# Patient Record
Sex: MALE | Race: WHITE | NOT HISPANIC OR LATINO | Employment: FULL TIME
[De-identification: names, ages, dates, MRNs, and addresses within clinical notes are randomized per-mention and may not be internally consistent; named-entity substitution may affect disease eponyms.]

---

## 2021-03-30 ENCOUNTER — TRANSCRIBE ORDERS (OUTPATIENT)
Dept: OTHER | Age: 56
End: 2021-03-30

## 2021-03-30 DIAGNOSIS — M54.50 LOW BACK PAIN WITHOUT SCIATICA, UNSPECIFIED BACK PAIN LATERALITY, UNSPECIFIED CHRONICITY: Primary | ICD-10-CM

## 2021-03-30 DIAGNOSIS — M54.2 NECK PAIN: ICD-10-CM

## 2021-04-20 ENCOUNTER — HOSPITAL ENCOUNTER (OUTPATIENT)
Dept: PHYSICAL THERAPY | Facility: CLINIC | Age: 56
Setting detail: THERAPIES SERIES
End: 2021-04-20
Attending: FAMILY MEDICINE
Payer: COMMERCIAL

## 2021-04-20 DIAGNOSIS — M54.50 LOW BACK PAIN WITHOUT SCIATICA, UNSPECIFIED BACK PAIN LATERALITY, UNSPECIFIED CHRONICITY: ICD-10-CM

## 2021-04-20 DIAGNOSIS — M54.2 NECK PAIN: ICD-10-CM

## 2021-04-20 PROCEDURE — 97140 MANUAL THERAPY 1/> REGIONS: CPT | Mod: GP | Performed by: PHYSICAL THERAPIST

## 2021-04-20 PROCEDURE — 97112 NEUROMUSCULAR REEDUCATION: CPT | Mod: GP | Performed by: PHYSICAL THERAPIST

## 2021-04-20 PROCEDURE — 97162 PT EVAL MOD COMPLEX 30 MIN: CPT | Mod: GP | Performed by: PHYSICAL THERAPIST
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
## 2021-04-27 ENCOUNTER — HOSPITAL ENCOUNTER (OUTPATIENT)
Dept: PHYSICAL THERAPY | Facility: CLINIC | Age: 56
Setting detail: THERAPIES SERIES
End: 2021-04-27
Attending: FAMILY MEDICINE
Payer: COMMERCIAL

## 2021-04-27 PROCEDURE — 97012 MECHANICAL TRACTION THERAPY: CPT | Mod: GP | Performed by: PHYSICAL THERAPIST

## 2021-04-27 PROCEDURE — 97140 MANUAL THERAPY 1/> REGIONS: CPT | Mod: GP | Performed by: PHYSICAL THERAPIST

## 2021-04-27 PROCEDURE — 97110 THERAPEUTIC EXERCISES: CPT | Mod: GP | Performed by: PHYSICAL THERAPIST

## 2021-05-17 ENCOUNTER — HOSPITAL ENCOUNTER (OUTPATIENT)
Dept: PHYSICAL THERAPY | Facility: CLINIC | Age: 56
Setting detail: THERAPIES SERIES
End: 2021-05-17
Attending: FAMILY MEDICINE
Payer: COMMERCIAL

## 2021-05-17 PROCEDURE — 97112 NEUROMUSCULAR REEDUCATION: CPT | Mod: GP | Performed by: PHYSICAL THERAPIST

## 2021-05-24 ENCOUNTER — HOSPITAL ENCOUNTER (OUTPATIENT)
Dept: PHYSICAL THERAPY | Facility: CLINIC | Age: 56
Setting detail: THERAPIES SERIES
End: 2021-05-24
Attending: FAMILY MEDICINE
Payer: COMMERCIAL

## 2021-05-24 PROCEDURE — 97140 MANUAL THERAPY 1/> REGIONS: CPT | Mod: GP | Performed by: PHYSICAL THERAPIST

## 2021-05-24 PROCEDURE — 97112 NEUROMUSCULAR REEDUCATION: CPT | Mod: GP | Performed by: PHYSICAL THERAPIST

## 2021-05-28 ENCOUNTER — TELEPHONE (OUTPATIENT)
Dept: PHYSICAL THERAPY | Facility: CLINIC | Age: 56
End: 2021-05-28

## 2021-07-13 ENCOUNTER — TRANSFERRED RECORDS (OUTPATIENT)
Dept: HEALTH INFORMATION MANAGEMENT | Facility: CLINIC | Age: 56
End: 2021-07-13

## 2021-07-19 ENCOUNTER — MEDICAL CORRESPONDENCE (OUTPATIENT)
Dept: HEALTH INFORMATION MANAGEMENT | Facility: CLINIC | Age: 56
End: 2021-07-19

## 2021-07-19 ENCOUNTER — TRANSFERRED RECORDS (OUTPATIENT)
Dept: HEALTH INFORMATION MANAGEMENT | Facility: CLINIC | Age: 56
End: 2021-07-19

## 2021-08-03 NOTE — DISCHARGE SUMMARY
Outpatient Physical Therapy Discharge Note     Patient: Thad Guerra  : 1965    Beginning/End Dates of Reporting Period:  4 sessions between  and 2021    Referring Provider: Last Hill MD    Therapy Diagnosis: L low back L3-4-5 facet tightness with sciatic nerve irritation     Client Self Report: At the time of his last session: Patient upset as he is in pain. He feels there may be a disc and he thinks an MRI is needed    Objective Measurements:   Patient in severe pain and requested leg pull trial again as this was helpful. He had some relief with the distraction.          Goals:  Goal Identifier Sleep   Goal Description Jeovany will be able to sleep through the night for healing using support for legs and spine   Target Date 21   Date Met      Progress (detail required for progress note): not met at time of last session     Goal Identifier Home program   Goal Description Jeovany will be compliant with home program and be able to apply concepts of pain so he can return to his previous level of function.    Target Date 06/15/21   Date Met      Progress (detail required for progress note): acute symptoms not met     Plan:  Discharge from therapy.    Discharge:    Reason for Discharge: Patient has failed to schedule further appointments.    Equipment Issued: none    Discharge Plan: did not complete PT and he wanted to consider MRI which is reasonable due to acute symptoms. Unable to contact patient by phone prior to discharge.

## 2021-08-16 ENCOUNTER — OFFICE VISIT (OUTPATIENT)
Dept: NEUROSURGERY | Facility: CLINIC | Age: 56
End: 2021-08-16
Payer: COMMERCIAL

## 2021-08-16 VITALS
DIASTOLIC BLOOD PRESSURE: 68 MMHG | BODY MASS INDEX: 34.51 KG/M2 | SYSTOLIC BLOOD PRESSURE: 118 MMHG | TEMPERATURE: 97.6 F | HEIGHT: 71 IN | WEIGHT: 246.5 LBS

## 2021-08-16 DIAGNOSIS — M47.816 LUMBAR FACET ARTHROPATHY: Primary | ICD-10-CM

## 2021-08-16 PROCEDURE — 99203 OFFICE O/P NEW LOW 30 MIN: CPT | Performed by: PHYSICIAN ASSISTANT

## 2021-08-16 ASSESSMENT — MIFFLIN-ST. JEOR: SCORE: 1980.25
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
## 2021-08-16 NOTE — PROGRESS NOTES
"Thad Guerra is a 55 year old male who presents for:  No chief complaint on file.       Initial Vitals:  /68   Temp 97.6  F (36.4  C) (Temporal)   Ht 5' 11\" (1.803 m)   Wt 246 lb 8 oz (111.8 kg)   BMI 34.38 kg/m   Estimated body mass index is 34.38 kg/m  as calculated from the following:    Height as of this encounter: 5' 11\" (1.803 m).    Weight as of this encounter: 246 lb 8 oz (111.8 kg).. Body surface area is 2.37 meters squared. BP completed using cuff size: regular  Data Unavailable    Nursing Comments:     Susan Trejo CMA      "
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
## 2021-09-13 ENCOUNTER — TELEPHONE (OUTPATIENT)
Dept: NEUROSURGERY | Facility: CLINIC | Age: 56
End: 2021-09-13

## 2021-11-11 ENCOUNTER — OFFICE VISIT (OUTPATIENT)
Dept: NEUROSURGERY | Facility: CLINIC | Age: 56
End: 2021-11-11
Payer: COMMERCIAL

## 2021-11-11 VITALS
DIASTOLIC BLOOD PRESSURE: 72 MMHG | WEIGHT: 257.3 LBS | TEMPERATURE: 97.4 F | BODY MASS INDEX: 36.02 KG/M2 | SYSTOLIC BLOOD PRESSURE: 126 MMHG | HEIGHT: 71 IN

## 2021-11-11 DIAGNOSIS — M54.16 LUMBAR RADICULOPATHY: Primary | ICD-10-CM

## 2021-11-11 PROCEDURE — 99213 OFFICE O/P EST LOW 20 MIN: CPT | Performed by: NEUROLOGICAL SURGERY

## 2021-11-11 ASSESSMENT — PAIN SCALES - GENERAL: PAINLEVEL: SEVERE PAIN (7)

## 2021-11-11 ASSESSMENT — MIFFLIN-ST. JEOR: SCORE: 2029.24

## 2021-11-11 NOTE — PATIENT INSTRUCTIONS
1. Referral to [facility] for management. They will call you to schedule. [phone]    ADAM Rico  --  [facility]  Phone: [phone]  Fax: [phone]
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
## 2021-11-11 NOTE — PROGRESS NOTES
Pain management referral faxed to [facility] at [phone]. Verified via Right[employer]ax.    Trisha Kurtz RN on 11/11/2021 at 3:58 PM

## 2021-11-11 NOTE — PROGRESS NOTES
"Thad Guerra is a 55 year old male who presents for:  Chief Complaint   Patient presents with     Neurologic Problem        Initial Vitals:  /72   Temp 97.4  F (36.3  C) (Temporal)   Ht 5' 11\" (1.803 m)   Wt 257 lb 4.8 oz (116.7 kg)   BMI 35.89 kg/m   Estimated body mass index is 35.89 kg/m  as calculated from the following:    Height as of this encounter: 5' 11\" (1.803 m).    Weight as of this encounter: 257 lb 4.8 oz (116.7 kg).. Body surface area is 2.42 meters squared. BP completed using cuff size: regular  Severe Pain (7)    Nursing Comments:     Susan Trejo  "
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
## 2022-09-21 LAB
CHOLESTEROL (EXTERNAL): 149 MG/DL (ref 100–199)
HDLC SERPL-MCNC: 43 MG/DL
HEP C HIM: NORMAL
LDL CHOLESTEROL CALCULATED (EXTERNAL): 92 MG/DL
NON HDL CHOLESTEROL (EXTERNAL): 106 MG/DL
TRIGLYCERIDES (EXTERNAL): 68 MG/DL

## 2023-09-19 ENCOUNTER — NURSE TRIAGE (OUTPATIENT)
Dept: FAMILY MEDICINE | Facility: OTHER | Age: 58
End: 2023-09-19
Payer: COMMERCIAL

## 2023-09-19 ENCOUNTER — OFFICE VISIT (OUTPATIENT)
Dept: URGENT CARE | Facility: URGENT CARE | Age: 58
End: 2023-09-19
Payer: COMMERCIAL

## 2023-09-19 VITALS
HEART RATE: 94 BPM | OXYGEN SATURATION: 96 % | WEIGHT: 261.8 LBS | DIASTOLIC BLOOD PRESSURE: 75 MMHG | TEMPERATURE: 98.1 F | BODY MASS INDEX: 36.51 KG/M2 | SYSTOLIC BLOOD PRESSURE: 120 MMHG

## 2023-09-19 DIAGNOSIS — R07.89 CHEST TIGHTNESS: Primary | ICD-10-CM

## 2023-09-19 PROCEDURE — 93000 ELECTROCARDIOGRAM COMPLETE: CPT

## 2023-09-19 PROCEDURE — 99204 OFFICE O/P NEW MOD 45 MIN: CPT | Mod: 25

## 2023-09-19 NOTE — TELEPHONE ENCOUNTER
"SITUATION:   Chest pain, left arm pain    BACKGROUND:   The patient describes \" chest tightness\" in his chest. He has left arm pain.   Symptoms have been present for about a year.   Chest Tightness - Denies difficult breathing. He feels tight in the sternum area. The sensation radiates into the left shoulder. The feeling is constant for about a week.   Per patient his wife feels his face is swollen.   The patient feels weak.     HOME TREATMENTS:  Rest    HEALTH HISTORY:  No hx of chest pain or heart attack, dm, hypertension, hyperlipemia.   Father had 2 stents placed/pacemaker at 91 years old.   Smokes mariajuana.      PATIENT REQUEST:       NURSE RECOMMENDATION:   Recommend going into the ER.     PLAN:   Patient will go to the .     Does the patient meet one of the following criteria for ADS visit consideration? No     RECOMMENDED DISPOSITION:  Urgent Care/ ER if symptoms worsen.   Will comply with recommendation: yes   If further questions/concerns or if Sx do not improve, worsen or new Sx develop, call your PCP or [facility] as soon as possible.    NOTES:  Disposition was determined by the first positive assessment question, therefore all previous assessment questions were negative.       TIP MaharajN, RN, PHN  [place] [facility]  September 19, 2023    Reason for Disposition   Pain also in shoulder(s) or arm(s) or jaw    Additional Information   Negative: SEVERE difficulty breathing (e.g., struggling for each breath, speaks in single words)   Negative: Difficult to awaken or acting confused (e.g., disoriented, slurred speech)   Negative: Shock suspected (e.g., cold/pale/clammy skin, too weak to stand, low BP, rapid pulse)   Negative: Passed out (i.e., lost consciousness, collapsed and was not responding)   Negative: Chest pain lasting longer than 5 minutes and over 44 years old   Negative: Chest pain lasting longer than 5 minutes, over 30 years old, and at least one cardiac " risk factor (e.g., diabetes mellitus, high blood pressure, high cholesterol, smoker, or strong family history of heart disease)   Negative: Chest pain lasting longer than 5 minutes and history of heart disease (i.e., angina, heart attack, heart failure, bypass surgery, takes nitroglycerin)   Negative: Chest pain lasting longer than 5 minutes and pain is crushing, pressure-like, or heavy   Negative: Heart beating < 50 beats per minute OR > 140 beats per minute   Negative: Visible sweat on face or sweat dripping down face   Negative: Sounds like a life-threatening emergency to the triager    Protocols used: Chest Pain-A-[place]

## 2023-09-19 NOTE — PATIENT INSTRUCTIONS
Given you current symptoms and the abnormal EKG; it is advised that your proceed to an emergency department for further evaluation and treatment.

## 2023-09-19 NOTE — PROGRESS NOTES
Assessment & Plan   (R07.89) Chest tightness  (primary encounter diagnosis)  Plan: EKG 12-lead complete w/read - Clinics    Informed the patient that given his current and persistent symptoms and the abnormal EKG; it is advised that he proceed to an emergency department for further evaluation and treatment.  No prior EKG to compare the EKG performed in the clinic today.  The patient acknowledged his understanding of the above plan and indicated he would proceed to an emergency department for further evaluation and treatment.    The use of Dragon/Boommy Fashionation services may have been used to construct the content in this note; any grammatical or spelling errors are non-intentional. Please contact the author of this note directly if you are in need of any clarification.      BRENDEN Hernadez [facility] [place] One[place] is a 57 year old male who presents to clinic today for the following health issues:  Chief Complaint   Patient presents with    chest tightness     When sitting he can feel his heart beating, going on for 3 months. Pt is having pain in left arm, sometime he cannot feel fingers, tingling in fingers.     Fatigue     Pt feels weak noticed it for 3 months - about the same     HPI  The patient reports having intermittent chest tightness, left shoulder pain, weakness and feels his heart while he is sitting that has been occurring for the past 3 months.  He has not utilized any treatment for this and indicates he would like to have testing done to make sure it is not his heart.  He states he spoke with his neighbor who told him that given his age and weight he should get his heart checked out.    ROS:  Negative except noted above.    Review of Systems        Objective    /75 (BP Location: Left arm, Patient Position: Sitting, Cuff Size: Adult Large)   Pulse 94   Temp 98.1  F (36.7  C) (Tympanic)   Wt 118.8 kg (261 lb 12.8 oz)   SpO2 96%    BMI 36.51 kg/m    Physical Exam   GENERAL: healthy, alert and no distress  NECK: no adenopathy, no asymmetry, masses, or scars and thyroid normal to palpation  RESP: lungs clear to auscultation - no rales, rhonchi or wheezes  CV: regular rate and rhythm, normal S1 S2, no S3 or S4, no murmur, click or rub, no peripheral edema and peripheral pulses strong  MS: no gross musculoskeletal defects noted, no edema  SKIN: no suspicious lesions or rashes  NEURO: Normal strength and tone, mentation intact and speech normal  PSYCH: mentation appears normal, affect normal/bright

## 2023-09-20 ENCOUNTER — TRANSFERRED RECORDS (OUTPATIENT)
Dept: MULTI SPECIALTY CLINIC | Facility: CLINIC | Age: 58
End: 2023-09-20

## 2023-09-20 LAB — GLUCOSE (EXTERNAL): 154 MG/DL (ref 70–99)

## 2023-12-07 ENCOUNTER — HOSPITAL ENCOUNTER (EMERGENCY)
Facility: CLINIC | Age: 58
Discharge: HOME | End: 2023-12-07
Attending: FAMILY MEDICINE | Admitting: FAMILY MEDICINE
Payer: COMMERCIAL

## 2023-12-07 ENCOUNTER — NURSE TRIAGE (OUTPATIENT)
Dept: NURSING | Facility: CLINIC | Age: 58
End: 2023-12-07
Payer: COMMERCIAL

## 2023-12-07 ENCOUNTER — APPOINTMENT (OUTPATIENT)
Dept: GENERAL RADIOLOGY | Facility: CLINIC | Age: 58
End: 2023-12-07
Attending: FAMILY MEDICINE
Payer: COMMERCIAL

## 2023-12-07 VITALS
HEART RATE: 82 BPM | RESPIRATION RATE: 16 BRPM | OXYGEN SATURATION: 97 % | SYSTOLIC BLOOD PRESSURE: 160 MMHG | DIASTOLIC BLOOD PRESSURE: 90 MMHG | TEMPERATURE: 97.9 F

## 2023-12-07 DIAGNOSIS — J20.9 ACUTE BRONCHITIS, UNSPECIFIED ORGANISM: ICD-10-CM

## 2023-12-07 PROCEDURE — 250N000009 HC RX 250: Performed by: FAMILY MEDICINE

## 2023-12-07 PROCEDURE — 71045 X-RAY EXAM CHEST 1 VIEW: CPT

## 2023-12-07 PROCEDURE — 99283 EMERGENCY DEPT VISIT LOW MDM: CPT | Mod: 25 | Performed by: FAMILY MEDICINE

## 2023-12-07 PROCEDURE — 94640 AIRWAY INHALATION TREATMENT: CPT | Performed by: FAMILY MEDICINE

## 2023-12-07 PROCEDURE — 99284 EMERGENCY DEPT VISIT MOD MDM: CPT | Performed by: FAMILY MEDICINE

## 2023-12-07 RX ORDER — IPRATROPIUM BROMIDE AND ALBUTEROL SULFATE 2.5; .5 MG/3ML; MG/3ML
3 SOLUTION RESPIRATORY (INHALATION) ONCE
Status: COMPLETED | OUTPATIENT
Start: 2023-12-07 | End: 2023-12-07

## 2023-12-07 RX ORDER — ALBUTEROL SULFATE 0.83 MG/ML
2.5 SOLUTION RESPIRATORY (INHALATION) EVERY 6 HOURS PRN
Qty: 75 ML | Refills: 0 | Status: SHIPPED | OUTPATIENT
Start: 2023-12-07 | End: 2024-05-13

## 2023-12-07 RX ADMIN — IPRATROPIUM BROMIDE AND ALBUTEROL SULFATE 3 ML: 2.5; .5 SOLUTION RESPIRATORY (INHALATION) at 06:31

## 2023-12-07 ASSESSMENT — ACTIVITIES OF DAILY LIVING (ADL): ADLS_ACUITY_SCORE: 35

## 2023-12-07 NOTE — ED TRIAGE NOTES
"Cough beginning 11/27. Seen in clinic yesterday and prescribed ABX, prednisone and inhaler. Pt is experiencing coughing fits that \"kill me and take my breath away\".      Triage Assessment (Adult)       Row Name 12/07/23 0616          Triage Assessment    Airway WDL WDL        Respiratory WDL    Respiratory WDL X;cough     Cough Frequency frequent     Cough Type congested        Skin Circulation/Temperature WDL    Skin Circulation/Temperature WDL WDL        Cardiac WDL    Cardiac WDL WDL        Peripheral/Neurovascular WDL    Peripheral Neurovascular WDL WDL        Cognitive/Neuro/Behavioral WDL    Cognitive/Neuro/Behavioral WDL WDL                     "

## 2023-12-07 NOTE — ED PROVIDER NOTES
History     Chief Complaint   Patient presents with    Shortness of Breath     HPI  Thad Guerra is a 57 year old male who presents with concerns of coughing fits and unable to catch his breath.  Patient has had a cough for the past 10 days now, patient went to urgent care yesterday and they started the patient on prednisone, amoxicillin, and inhaler and Robitussin with codeine.  Patient states that he did start the medicines last night.  Patient states he had a lot of congestion in his chest but since starting all this he is having less congestion.  He states that he was trying to sleep and they woke up with a severe coughing spasm and felt like every time he is trying to exhale, he could not stop coughing or catch his breath.  Patient denies any fevers or chills.  Denies any chest pain.    Allergies:  No Known Allergies    Problem List:    There are no problems to display for this patient.       Past Medical History:    Past Medical History:   Diagnosis Date    Bell's palsy        Past Surgical History:    No past surgical history on file.    Family History:    No family history on file.    Social History:  Marital Status:   [2]  Social History     Tobacco Use    Smoking status: Never    Smokeless tobacco: Never   Substance Use Topics    Alcohol use: Yes    Drug use: Yes     Types: Marijuana        Medications:    albuterol (PROVENTIL) (2.5 MG/3ML) 0.083% neb solution  acyclovir (ZOVIRAX) 400 MG tablet  predniSONE (DELTASONE) 20 MG tablet  traMADol (ULTRAM) 50 MG tablet          Review of Systems   All other systems reviewed and are negative.      Physical Exam   BP: (!) 160/90  Pulse: 82  Temp: 97.9  F (36.6  C)  Resp: 16  SpO2: 97 %      Physical Exam  Vitals and nursing note reviewed.   Constitutional:       General: He is not in acute distress.     Appearance: He is well-developed. He is not diaphoretic.   HENT:      Head: Normocephalic and atraumatic.      Nose: Nose normal.      Mouth/Throat:       Pharynx: No oropharyngeal exudate.   Eyes:      General: No scleral icterus.     Conjunctiva/sclera: Conjunctivae normal.      Pupils: Pupils are equal, round, and reactive to light.   Cardiovascular:      Rate and Rhythm: Normal rate and regular rhythm.      Heart sounds: Normal heart sounds. No murmur heard.     No friction rub.   Pulmonary:      Effort: Pulmonary effort is normal. No respiratory distress.      Breath sounds: Wheezing and rhonchi present. No rales.   Abdominal:      General: Bowel sounds are normal. There is no distension.      Palpations: Abdomen is soft. There is no mass.      Tenderness: There is no abdominal tenderness. There is no guarding or rebound.   Musculoskeletal:         General: No tenderness. Normal range of motion.      Cervical back: Normal range of motion.   Skin:     General: Skin is warm.      Findings: No rash.   Neurological:      Mental Status: He is alert and oriented to person, place, and time.   Psychiatric:         Judgment: Judgment normal.         ED Course                 Procedures         Results for orders placed or performed during the hospital encounter of 12/07/23 (from the past 24 hour(s))   XR Chest Port 1 View    Narrative    EXAM: XR CHEST PORT 1 VIEW  LOCATION: [facility]  DATE: 12/7/2023    INDICATION: cough, sob  COMPARISON: None.      Impression    IMPRESSION: Slight atelectasis or scarring at the left base. Lungs otherwise clear. Normal heart size and pulmonary vascularity. No pleural fluid or pneumothorax.       Medications   ipratropium - albuterol 0.5 mg/2.5 mg/3 mL (DUONEB) neb solution 3 mL (3 mLs Nebulization $Given 12/7/23 0631)       X-ray did not show any acute findings, mostly atelectasis.  Patient feels significantly better after the nebulizer treatment so we will send the patient home with a nebulizer and albuterol for this to use instead.  I think as the steroid start to kick in that he just started last  night, he should start to feel better.  Patient will be discharged at this time.    Assessments & Plan (with Medical Decision Making)  Acute bronchitis     I have reviewed the nursing notes.    I have reviewed the findings, diagnosis, plan and need for follow up with the patient.        New Prescriptions    ALBUTEROL (PROVENTIL) (2.5 MG/3ML) 0.083% NEB SOLUTION    Take 1 vial (2.5 mg) by nebulization every 6 hours as needed for shortness of breath, wheezing or cough       Final diagnoses:   Acute bronchitis, unspecified organism       12/7/2023   [facility] EMERGENCY DEPT       Karson Olmos MD  12/07/23 0757

## 2023-12-07 NOTE — TELEPHONE ENCOUNTER
Six days trouble breathing. Had severe cough with lots of mucus in lungs. Yesterday went to [facility], urgent care. Prescribed prednisone 20 mg, two tablets. Given inhaler, amonoclav (amoxicillin), robitussin with codeine. Now mucus is gone out of lungs, two doses of antibiotics, prednisone, cough medication taken. If he inhales, when he exhales, lungs start going weird. Was sleeping and woke starving for oxygen.  He stated he can't sit in the ER for six hours.  He doesn't have a clinic he goes to. Refuses to [facility] to [facility] because he doesn't trust them there.  He said he will drive to the [facility] ER. I called the ER to find out how many people were waiting in the ER and told this to the patient, it's a good time to go there now. He said he will drive there himself.  Dannielle Weinstein RN  [facility]    Reason for Disposition   [1] MODERATE difficulty breathing (e.g., speaks in phrases, SOB even at rest, pulse 100-120) AND [2] NEW-onset or WORSE than normal    Additional Information   Negative: SEVERE difficulty breathing (e.g., struggling for each breath, speaks in single words)   Negative: [1] Breathing stopped AND [2] hasn't returned   Negative: Choking on something   Negative: Bluish (or gray) lips or face now   Negative: Difficult to awaken or acting confused (e.g., disoriented, slurred speech)   Negative: Passed out (i.e., lost consciousness, collapsed and was not responding)   Negative: Wheezing started suddenly after medicine, an allergic food or bee sting   Negative: Stridor (harsh sound while breathing in)   Negative: Slow, shallow and weak breathing   Negative: Sounds like a life-threatening emergency to the triager   Negative: Chest pain   Negative: [1] Wheezing (high pitched whistling sound) AND [2] previous asthma attacks or use of asthma medicines   Negative: [1] Difficulty breathing AND [2] only present when coughing   Negative: [1] Difficulty breathing AND [2] only from stuffy  or runny nose   Negative: [1] Difficulty breathing AND [2] within 14 days of COVID-19 Exposure    Protocols used: Breathing Difficulty-A-AH

## 2023-12-07 NOTE — DISCHARGE INSTRUCTIONS
1.  Use the albuterol nebulizer every 4 hours as needed for coughing fits or bronchospasm.  Use this in place of the inhaler.

## 2023-12-25 ENCOUNTER — HOSPITAL ENCOUNTER (EMERGENCY)
Facility: CLINIC | Age: 58
Discharge: HOME | End: 2023-12-25
Attending: FAMILY MEDICINE | Admitting: FAMILY MEDICINE
Payer: COMMERCIAL

## 2023-12-25 ENCOUNTER — APPOINTMENT (OUTPATIENT)
Dept: GENERAL RADIOLOGY | Facility: CLINIC | Age: 58
End: 2023-12-25
Attending: FAMILY MEDICINE
Payer: COMMERCIAL

## 2023-12-25 VITALS
DIASTOLIC BLOOD PRESSURE: 98 MMHG | WEIGHT: 272.6 LBS | SYSTOLIC BLOOD PRESSURE: 156 MMHG | HEART RATE: 92 BPM | BODY MASS INDEX: 38.02 KG/M2 | RESPIRATION RATE: 18 BRPM | TEMPERATURE: 97.8 F | OXYGEN SATURATION: 98 %

## 2023-12-25 DIAGNOSIS — M75.32 CALCIFIC TENDONITIS OF LEFT SHOULDER: ICD-10-CM

## 2023-12-25 DIAGNOSIS — M25.512 ACUTE PAIN OF LEFT SHOULDER: ICD-10-CM

## 2023-12-25 PROCEDURE — 73030 X-RAY EXAM OF SHOULDER: CPT | Mod: LT

## 2023-12-25 PROCEDURE — 99283 EMERGENCY DEPT VISIT LOW MDM: CPT | Performed by: FAMILY MEDICINE

## 2023-12-25 PROCEDURE — 99284 EMERGENCY DEPT VISIT MOD MDM: CPT | Performed by: FAMILY MEDICINE

## 2023-12-25 ASSESSMENT — ACTIVITIES OF DAILY LIVING (ADL): ADLS_ACUITY_SCORE: 35

## 2023-12-25 NOTE — ED TRIAGE NOTES
L shoulder pain started 4 days ago and it is worsening to the point that he can not raise his arm

## 2023-12-25 NOTE — ED PROVIDER NOTES
ED Provider Note     Thad Guerra  [id number]  December 25, 2023      CC:     Chief Complaint   Patient presents with    Shoulder Pain          History is obtained from patient.    HPI: Thad Guerra is a 58 year old male presenting with 4-day history of left shoulder pain, worse over the last 2 days.  Patient is having difficulty raising his left shoulder due to severe pain.  He denies any recent injury/trauma.  He has never had anything like this before.  He had 3 weeks of the respiratory illness and is still getting over this.  He is right-hand dominant and has been laid off recently so he is not doing anything exertional or out of the ordinary.  He typically does not lay on his left shoulder but finds that it is impossible at nighttime to lay on the shoulder.  He feels most comfortable when he is able to lay on his back and have his arms straight out or if he has a pillow that he can hug with his left arm around the pillow.  He does not have any numbness or tingling into the fingers.  No pain from the elbows down.  He denies any neck pain.  Pain is currently rated 8.5/10 with movement, and 3/10 at rest.        PMH/Problem List:   Past Medical History:   Diagnosis Date    Bell's palsy        PSH: History reviewed. No pertinent surgical history.    MEDS: No current facility-administered medications on file prior to encounter.  acyclovir (ZOVIRAX) 400 MG tablet, Take 1 tablet (400 mg) by mouth 5 times daily (Patient not taking: Reported on 8/16/2021)  albuterol (PROVENTIL) (2.5 MG/3ML) 0.083% neb solution, Take 1 vial (2.5 mg) by nebulization every 6 hours as needed for shortness of breath, wheezing or cough  predniSONE (DELTASONE) 20 MG tablet, Take 3 tabs (60 mg) orally daily for 3 days, 2 tabs (40 mg) orally daily for 3 days, 1 tab (20 mg) orally daily for 3 days, then 1/2 tab (10 mg) orally for 3 days (Patient not taking: Reported on  8/16/2021)  traMADol (ULTRAM) 50 MG tablet, Take 1 tablet (50 mg) by mouth every 6 hours as needed for moderate pain (Patient not taking: Reported on 8/16/2021)        Allergies: Patient has no known allergies.    Triage and nursing notes were reviewed.    ROS: All other systems were reviewed and are negative    Physical Exam:  Vitals:    12/25/23 1419   BP: (!) 156/98   Pulse: 92   Resp: 18   Temp: 97.8  F (36.6  C)   TempSrc: Oral   SpO2: 98%   Weight: 123.7 kg (272 lb 9.6 oz)     GENERAL APPEARANCE: Alert, moderate distress due to left shoulder pain  HEAD: atraumatic  EYES: PER  HENT: Normal external exam  NECK: no adenopathy or masses, trachea is midline  RESP: lungs clear to auscultation - no rales, rhonchi or wheezes  CV: regular rate and rhythm, no significant murmurs or rubs  ABDOMEN: soft, nontender, no masses with normal bowel sounds  EXT: Anterior and superior shoulder pain.  Decreased abduction and flexion due to pain; positive impingement sign.  Good pulses distally.  SKIN: no rash; as above  NEURO: mentation and speech normal; no focal deficits    Labs/Imaging Results:  Results for orders placed or performed during the hospital encounter of 12/25/23 (from the past 24 hour(s))   XR Shoulder Left G/E 3 Views    Narrative    EXAM: XR SHOULDER LEFT G/E 3 VIEWS  LOCATION: [facility]  DATE: 12/25/2023    INDICATION: Pain.  COMPARISON: None.      Impression    IMPRESSION: No acute fracture or dislocation. Mild glenohumeral joint osteoarthrosis. Amorphous calcification projecting near the expected location of the rotator cuff insertion on the greater tuberosity, likely representing calcific tendinopathy.           Impression:  Final diagnoses:   Acute pain of left shoulder         ED Course & Medical Decision Making (Plan):  Thad Guerra is a 58 year old male with nontraumatic left shoulder pain over the past 4 days, to the point where he has difficulty raising the left  shoulder.  Patient has no radicular symptoms and denies any neck pain.  He has no numbness or tingling into the distal left extremity.  Patient states that the pain is associated with any movement of the left shoulder.  He is able to move his elbow, forearm and wrist.  Patient has very limited flexion and abduction on exam.  Pain is over the anterior and superior aspect of the left shoulder.  He is right-hand dominant and denies any activities that triggered this pain.  He may have slept on it wrong as he woke up 1 morning with the pain.  Patient states that the pain is currently rated 3 out of 10 at rest, and 9 out of 10 with movement.  He is able to sleep in certain positions.    Vital signs reveal a temp of 97.8, blood pressure 156/98, heart rate of 92, respiratory rate of 18 with 98% oxygen saturation.  Lungs are clear, heart sounds are normal.  Patient has no pain emanating from the neck.  He is slightly limited but good range of motion at the neck.  He has tenderness over the superior and anterior left shoulder.  Positive impingement sign.  Flexion to approximately 20 degrees and abduction to about 20 degrees.  Normal range of motion in the elbow, forearms and wrist.    X-ray of the left shoulder reveals no acute fracture or dislocation.  Mild glenohumeral joint osteoarthritis.  Amorphous calcification projecting near the expected location of the rotator cuff insertion on the greater tuberosity.  Patient likely has calcific tendinitis contributing to his left shoulder pain.  Patient was placed in a sling for comfort.  Begin early range of motion with pendulum swing and finger walking up the wall.  Ice/heat, and anti-inflammatory medication.  Patient will be referred to sports medicine for consultation and treatment.  Patient expressed understanding and agreement with discharge instructions below.      Written after-visit summary and instructions were given at the time of discharge.        Discharge  Instructions:  You have a calcified tendinitis of the left shoulder involving the rotator cuff.  Stay in the sling for comfort.  Begin pendulum swings as we discussed followed by finger walking up the wall.  Ice the shoulder initially, and add heat if it feels best.  Take ibuprofen up to 600 mg 4 times a day for the next 5-7 days.        Disclaimer: This note consists of words and symbols derived from keyboarding and dictation using voice recognition software.  As a result, there may be errors that have gone undetected.  Please consider this when interpreting information found in this note.       Chantel Ludwig MD  12/25/23 0741

## 2023-12-25 NOTE — DISCHARGE INSTRUCTIONS
You have a calcified tendinitis of the left shoulder involving the rotator cuff.  Stay in the sling for comfort.  Begin pendulum swings as we discussed followed by finger walking up the wall.  Ice the shoulder initially, and add heat if it feels best.  Take ibuprofen up to 600 mg 4 times a day for the next 5-7 days.  I will make a referral to the sports medicine specialist for consultation.  Expect improvement in 4 to 6 weeks.

## 2023-12-26 ENCOUNTER — OFFICE VISIT (OUTPATIENT)
Dept: ORTHOPEDICS | Facility: OTHER | Age: 58
End: 2023-12-26
Payer: COMMERCIAL

## 2023-12-26 VITALS
WEIGHT: 272 LBS | SYSTOLIC BLOOD PRESSURE: 133 MMHG | DIASTOLIC BLOOD PRESSURE: 82 MMHG | BODY MASS INDEX: 38.08 KG/M2 | HEIGHT: 71 IN

## 2023-12-26 DIAGNOSIS — M75.32 CALCIFIC TENDONITIS OF LEFT SHOULDER: ICD-10-CM

## 2023-12-26 DIAGNOSIS — M25.512 ACUTE PAIN OF LEFT SHOULDER: ICD-10-CM

## 2023-12-26 PROCEDURE — 99204 OFFICE O/P NEW MOD 45 MIN: CPT | Performed by: STUDENT IN AN ORGANIZED HEALTH CARE EDUCATION/TRAINING PROGRAM

## 2023-12-26 RX ORDER — METHYLPREDNISOLONE 4 MG
TABLET, DOSE PACK ORAL
Qty: 21 TABLET | Refills: 0 | Status: SHIPPED | OUTPATIENT
Start: 2023-12-26 | End: 2024-05-03

## 2023-12-26 RX ORDER — MELOXICAM 7.5 MG/1
15 TABLET ORAL DAILY
Qty: 30 TABLET | Refills: 0 | Status: SHIPPED | OUTPATIENT
Start: 2023-12-26 | End: 2024-05-13

## 2023-12-26 NOTE — PATIENT INSTRUCTIONS
Ana Thad Guerra ,     A copy of your assessment and our treatment plan that we discussed together is included below, as written in your medical chart.   If you have any questions, please feel free to call the clinic.     --------------------------------------------------  Thad Guerra is a 58 year old male that presents with acute left shoulder pain.  Pain is severe and in the lateral shoulder over the rotator cuff insertion where there is radiographic evidence of a moderate-sized calcific deposit in the likely supraspinatus tendon.  Pain with shoulder abduction and external rotation, limited active range, increased passive range today in the painful motions. History and physical exam appear most consistent with acute calcific tendinopathy.     Discussed the nature of the condition and treatment options and mutually agreed upon the following plan:    - Imaging:          - Reviewed relevant imaging in the chart.  - Reviewed results and images with patient.   - Medications:          - Discussed pharmacologic options for pain relief.   - Added a prescription for meloxicam to be taken daily, do not take other anti-inflammatories (ibuprofen, naproxen) while taking meloxicam.  If additional pain relief is needed throughout the day, can supplement with acetaminophen (Tylenol) as needed for additional pain control.  - Also added a prescription for a Medrol Dosepak (oral steroid).  Please take as directed on the packaging.  - May also use topical medications such as lidocaine, IcyHot, BioFreeze, or Voltaren gel as needed for pain control.   - Injections:          - Discussed possible injection options and alternatives.    - Options include corticosteroid injection with/without barbotage of the calcific deposit under ultrasound guidance.    - Would prefer to avoid needle procedures.  Deferred injections today and will consider them in the future as needed.   - Therapy:          - Discussed the benefits of  physical therapy vs home exercise program for optimization of range of motion, flexibility, strength, stability and function.   - Preference is for physical therapy.   - Physical Therapy referral placed today and instructed to call [phone] to schedule appointments.   - Modalities:          - May use ice, heat, massage or other modalities as needed.   - Bracing:          - Discussed bracing options and may use the sling for comfort in this acute stage, but encouraged to wean out of the sling over the next 2 days and continue with range of motion of the shoulder.  - Activity:          - Encouraged to remain active and participate in regular activities as symptoms allow.    - Follow up:          - In 4-6 weeks for re-evaluation and update to treatment plan, or sooner for new/worsening symptoms.  - Patient has clinic contact information for questions or concerns.   --------------------------------------------------    It was a pleasure seeing you today. Thank you for choosing [facility] for your care.       Kade Leonard DO, CAQSM  [facility] - Sports Medicine  [facility] - Department of Orthopedic Surgery     Disclaimer:  This note was prepared and written using Dragon Medical dictation software. As a result, there may be errors in the script that have gone undetected. Please consider this when interpreting the information in this note.

## 2023-12-26 NOTE — PROGRESS NOTES
Left lower lobe pulmonary nodules that have been stable since 2012 she had pulmonary evaluation in the past repeat CAT scan prior to next visit to evaluate her aortic root   Thad Guerra  :  1965  DOS: 2023  MRN: [id number]  PCP: No Ref-Primary, Physician    Sports Medicine Clinic Visit      HPI  Thad Guerra is a 58 year old right-hand-dominant male who is seen in consultation at the request of  Chantel Ludwig M.D. presenting with left shoulder pain    - Mechanism of Injury:  No inciting injury.  Woke up with the pain.  - Prior evaluation:   - ED visit 2023 and described a 4-day history of moderate to severe shoulder pain that is worse with overhead activities, lifting the arm over his head.  Endorses he is recovering from an upper respiratory infection over the past 3 weeks.  Significant pain with lying on the left shoulder at night.  Denies numbness, tingling, or radicular pain or neck pain at the visit.  Calcific tendinitis was suspected based on the x-ray and he was placed in a sling for comfort with instructions for pendulum ROM exercises and RICE protocol.  - XR L shoulder 2023 shows mild osteoarthritis of the GH joint.  Also showed a moderately sized possible calcification superior to the greater trochanteric location of the distal supraspinatus tendon.  No acute fractures or dislocations present.    - Pain Character:  Pain has been present for  5 days .  Pain is in the lateral shoulder, with radiation into the lateral upper arm intermittently.   - Endorses:  weakness, limited AROM  - Denies:  swelling, clicking, popping, grinding, mechanical locking symptoms, instability, numbness, tingling, radicular shooting pain.   - Alleviating factors:  rest  - Aggravating factors:   lying on left shoulder, shoulder flexion/abduction, reaching out in front, IR behind back  - Treatments tried:  rest, activity modifications, ice, heat, ibuprofen, topical medications, sling    - Patient Goals:  get a formal diagnosis, understand the cause of the symptoms, be able to manage the symptoms successfully  - Social History:  currently unemployed    - Pertinent  "PMH:  No previous history of injury to left shoulder.      Review of Systems  Musculoskeletal: as above  Remainder of review of systems is negative including constitutional, CV, pulmonary, GI, Skin and Neurologic except as noted in HPI or medical history.    Past Medical History:   Diagnosis Date    Bell's palsy      No past surgical history on file.  No family history on file.      Objective  /82   Ht 1.803 m (5' 11\")   Wt 123.4 kg (272 lb)   BMI 37.94 kg/m      General: healthy, alert and in no acute distress.    HEENT: no scleral icterus or conjunctival erythema.   Skin: no suspicious lesions or rash. No jaundice.   CV: regular rhythm by palpation, 2+ distal pulses.  Resp: normal respiratory effort without conversational dyspnea.   Psych: normal mood and affect.    Gait: nonantalgic, appropriate coordination and balance.     Neuro:        - Sensation to light touch:    - Intact throughout the BUE including all peripheral nerve distributions.        - MSR:       RUE  LUE  - Biceps  2+ 2+  - Brachioradialis 2+ 2+  - Triceps  2+ 2+       - Special tests:   - Spurling's:  Neg     MSK - Shoulder:       - Inspection:    - No significant swelling, erythema, warmth, ecchymosis, lesion, or atrophy noted.        - ROM:    - Limited in shoulder abduction, flexion, IR/ER by severe sharp lateral shoulder pain.       - Palpation:    - TTP at the lateral shoulder over the rotator cuff insertion where there is an especially tender point that correlates well with calcific deposit on the radiograph.  - NTTP elsewhere.        - Strength:  (*antalgic)  RUE LUE  - Shoulder Abduction   5 5*   - Shoulder Flexion   5 5*   - Shoulder Internal Rotation  5 5*   - Shoulder External Rotation  5 5*  - Elbow Flexion   5 5  - Elbow Extension   5 5  - Forearm Pronation   5 5  - Forearm Supination   5 5  - Wrist Extension   5 5  - Wrist Flexion    5 5  - FDI     5 5  - ADM     5 5  - FPL     5 5  - APB     5 5  - EIP     5 5  - " EDC     5 5  - APL/EPB    5 5           - Special tests:        - Murray: Positive   - Neers: Positive   - Empty can modified: Positive for pain and antalgic weakness   - [place]:  Neg    - Scarf:  Neg    - Speeds:  Neg    - Yergason:  Neg       Radiology  I independently reviewed the available relevant imaging, with the following interpretation:   - XR with interpretation as above in HPI      Assessment  1. Acute pain of left shoulder    2. Calcific tendonitis of left shoulder        Plan  Thad Guerra is a 58 year old male that presents with acute left shoulder pain.  Pain is severe and in the lateral shoulder over the rotator cuff insertion where there is radiographic evidence of a moderate-sized calcific deposit in the likely supraspinatus tendon.  Pain with shoulder abduction and external rotation, limited active range, increased passive range today in the painful motions. History and physical exam appear most consistent with acute calcific tendinopathy.      Differential also includes possible P[place]-Brasher syndrome based on recency of upper respiratory infection followed by severe pain.  If his severe pain resolves and is followed by weakness over the next couple weeks, this may be a possibility.  At this time his clinical picture fits calcific tendinopathy the best.    Discussed the nature of the condition and treatment options and mutually agreed upon the following plan:    - Imaging:          - Reviewed relevant imaging in the chart.  - Reviewed results and images with patient.   - Medications:          - Discussed pharmacologic options for pain relief.   - Added a prescription for meloxicam to be taken daily, do not take other anti-inflammatories (ibuprofen, naproxen) while taking meloxicam.  If additional pain relief is needed throughout the day, can supplement with acetaminophen (Tylenol) as needed for additional pain control.  - Also added a prescription for a Medrol Dosepak (oral steroid).   Please take as directed on the packaging.  - May also use topical medications such as lidocaine, IcyHot, BioFreeze, or Voltaren gel as needed for pain control.   - Injections:          - Discussed possible injection options and alternatives.    - Options include corticosteroid injection with/without barbotage of the calcific deposit under ultrasound guidance.    - Would prefer to avoid needle procedures.  Deferred injections today and will consider them in the future as needed.   - Therapy:          - Discussed the benefits of physical therapy vs home exercise program for optimization of range of motion, flexibility, strength, stability and function.   - Preference is for physical therapy.   - Physical Therapy referral placed today and instructed to call [phone] to schedule appointments.   - Modalities:          - May use ice, heat, massage or other modalities as needed.   - Bracing:          - Discussed bracing options and may use the sling for comfort in this acute stage, but encouraged to wean out of the sling over the next 2 days and continue with range of motion of the shoulder.  - Activity:          - Encouraged to remain active and participate in regular activities as symptoms allow.    - Follow up:          - In 4-6 weeks for re-evaluation and update to treatment plan, or sooner for new/worsening symptoms.  - Patient has clinic contact information for questions or concerns.       Kade Leonard DO, CAVICKIM  [facility] - Sports Medicine  [facility] - Department of Orthopedic Surgery       Disclaimer:  This note was prepared and written using Dragon Medical dictation software. As a result, there may be errors in the script that have gone undetected. Please consider this when interpreting the information in this note.

## 2023-12-26 NOTE — LETTER
2023         RE: Thad Guerra  [address]  [address]  [facility] [address]        Dear Colleague,    Thank you for referring your patient, Thad Guerra, to the [facility] [place]. Please see a copy of my visit note below.    Thad Guerra  :  1965  DOS: 2023  MRN: [id number]  PCP: No Ref-Primary, Physician    Sports Medicine Clinic Visit      HPI  Thad Guerra is a 58 year old right-hand-dominant male who is seen in consultation at the request of  Chantel Ludwig M.D. presenting with left shoulder pain    - Mechanism of Injury:  No inciting injury.  Woke up with the pain.  - Prior evaluation:   - ED visit 2023 and described a 4-day history of moderate to severe shoulder pain that is worse with overhead activities, lifting the arm over his head.  Endorses he is recovering from an upper respiratory infection over the past 3 weeks.  Significant pain with lying on the left shoulder at night.  Denies numbness, tingling, or radicular pain or neck pain at the visit.  Calcific tendinitis was suspected based on the x-ray and he was placed in a sling for comfort with instructions for pendulum ROM exercises and RICE protocol.  - XR L shoulder 2023 shows mild osteoarthritis of the GH joint.  Also showed a moderately sized possible calcification superior to the greater trochanteric location of the distal supraspinatus tendon.  No acute fractures or dislocations present.    - Pain Character:  Pain has been present for  5 days .  Pain is in the lateral shoulder, with radiation into the lateral upper arm intermittently.   - Endorses:  weakness, limited AROM  - Denies:  swelling, clicking, popping, grinding, mechanical locking symptoms, instability, numbness, tingling, radicular shooting pain.   - Alleviating factors:  rest  - Aggravating factors:   lying on left shoulder, shoulder flexion/abduction, reaching out in front, IR behind back  -  "Treatments tried:  rest, activity modifications, ice, heat, ibuprofen, topical medications, sling    - Patient Goals:  get a formal diagnosis, understand the cause of the symptoms, be able to manage the symptoms successfully  - Social History:  currently unemployed    - Pertinent PMH:  No previous history of injury to left shoulder.      Review of Systems  Musculoskeletal: as above  Remainder of review of systems is negative including constitutional, CV, pulmonary, GI, Skin and Neurologic except as noted in HPI or medical history.    Past Medical History:   Diagnosis Date     Bell's palsy      No past surgical history on file.  No family history on file.      Objective  /82   Ht 1.803 m (5' 11\")   Wt 123.4 kg (272 lb)   BMI 37.94 kg/m      General: healthy, alert and in no acute distress.    HEENT: no scleral icterus or conjunctival erythema.   Skin: no suspicious lesions or rash. No jaundice.   CV: regular rhythm by palpation, 2+ distal pulses.  Resp: normal respiratory effort without conversational dyspnea.   Psych: normal mood and affect.    Gait: nonantalgic, appropriate coordination and balance.     Neuro:        - Sensation to light touch:    - Intact throughout the BUE including all peripheral nerve distributions.        - MSR:       RUE  LUE  - Biceps  2+ 2+  - Brachioradialis 2+ 2+  - Triceps  2+ 2+       - Special tests:   - Spurling's:  Neg     MSK - Shoulder:       - Inspection:    - No significant swelling, erythema, warmth, ecchymosis, lesion, or atrophy noted.        - ROM:    - Limited in shoulder abduction, flexion, IR/ER by severe sharp lateral shoulder pain.       - Palpation:    - TTP at the lateral shoulder over the rotator cuff insertion where there is an especially tender point that correlates well with calcific deposit on the radiograph.  - NTTP elsewhere.        - Strength:  (*antalgic)  RUE LUE  - Shoulder Abduction   5 5*   - Shoulder Flexion   5 5*   - Shoulder Internal " Rotation  5 5*   - Shoulder External Rotation  5 5*  - Elbow Flexion   5 5  - Elbow Extension   5 5  - Forearm Pronation   5 5  - Forearm Supination   5 5  - Wrist Extension   5 5  - Wrist Flexion    5 5  - FDI     5 5  - ADM     5 5  - FPL     5 5  - APB     5 5  - EIP     5 5  - EDC     5 5  - APL/EPB    5 5           - Special tests:        - Murray: Positive   - Neers: Positive   - Empty can modified: Positive for pain and antalgic weakness   - Hartley:  Neg    - Scarf:  Neg    - Speeds:  Neg    - Yergason:  Neg       Radiology  I independently reviewed the available relevant imaging, with the following interpretation:   - XR with interpretation as above in HPI      Assessment  1. Acute pain of left shoulder    2. Calcific tendonitis of left shoulder        Plan  Thad Guerra is a 58 year old male that presents with acute left shoulder pain.  Pain is severe and in the lateral shoulder over the rotator cuff insertion where there is radiographic evidence of a moderate-sized calcific deposit in the likely supraspinatus tendon.  Pain with shoulder abduction and external rotation, limited active range, increased passive range today in the painful motions. History and physical exam appear most consistent with acute calcific tendinopathy.      Differential also includes possible Parsonage-Brasher syndrome based on recency of upper respiratory infection followed by severe pain.  If his severe pain resolves and is followed by weakness over the next couple weeks, this may be a possibility.  At this time his clinical picture fits calcific tendinopathy the best.    Discussed the nature of the condition and treatment options and mutually agreed upon the following plan:    - Imaging:          - Reviewed relevant imaging in the chart.  - Reviewed results and images with patient.   - Medications:          - Discussed pharmacologic options for pain relief.   - Added a prescription for meloxicam to be taken daily, do not  take other anti-inflammatories (ibuprofen, naproxen) while taking meloxicam.  If additional pain relief is needed throughout the day, can supplement with acetaminophen (Tylenol) as needed for additional pain control.  - Also added a prescription for a Medrol Dosepak (oral steroid).  Please take as directed on the packaging.  - May also use topical medications such as lidocaine, IcyHot, BioFreeze, or Voltaren gel as needed for pain control.   - Injections:          - Discussed possible injection options and alternatives.    - Options include corticosteroid injection with/without barbotage of the calcific deposit under ultrasound guidance.    - Would prefer to avoid needle procedures.  Deferred injections today and will consider them in the future as needed.   - Therapy:          - Discussed the benefits of physical therapy vs home exercise program for optimization of range of motion, flexibility, strength, stability and function.   - Preference is for physical therapy.   - Physical Therapy referral placed today and instructed to call [phone] to schedule appointments.   - Modalities:          - May use ice, heat, massage or other modalities as needed.   - Bracing:          - Discussed bracing options and may use the sling for comfort in this acute stage, but encouraged to wean out of the sling over the next 2 days and continue with range of motion of the shoulder.  - Activity:          - Encouraged to remain active and participate in regular activities as symptoms allow.    - Follow up:          - In 4-6 weeks for re-evaluation and update to treatment plan, or sooner for new/worsening symptoms.  - Patient has clinic contact information for questions or concerns.       Kade Leonard DO, CAQSM  [facility] - Sports Medicine  [facility] - Department of Orthopedic Surgery       Disclaimer:  This note was prepared and written using Dragon Medical dictation software. As a result, there  may be errors in the script that have gone undetected. Please consider this when interpreting the information in this note.        Again, thank you for allowing me to participate in the care of your patient.        Sincerely,        Kade Leonard, DO

## 2024-05-03 ENCOUNTER — HOSPITAL ENCOUNTER (EMERGENCY)
Facility: CLINIC | Age: 59
Discharge: HOME | End: 2024-05-03
Attending: FAMILY MEDICINE | Admitting: FAMILY MEDICINE
Payer: COMMERCIAL

## 2024-05-03 VITALS
BODY MASS INDEX: 38.5 KG/M2 | OXYGEN SATURATION: 96 % | HEART RATE: 79 BPM | TEMPERATURE: 98.2 F | WEIGHT: 275 LBS | DIASTOLIC BLOOD PRESSURE: 86 MMHG | SYSTOLIC BLOOD PRESSURE: 126 MMHG | HEIGHT: 71 IN | RESPIRATION RATE: 18 BRPM

## 2024-05-03 DIAGNOSIS — M54.40 ACUTE LEFT-SIDED LOW BACK PAIN WITH SCIATICA, SCIATICA LATERALITY UNSPECIFIED: ICD-10-CM

## 2024-05-03 PROCEDURE — 250N000011 HC RX IP 250 OP 636: Performed by: FAMILY MEDICINE

## 2024-05-03 PROCEDURE — 99284 EMERGENCY DEPT VISIT MOD MDM: CPT | Mod: 25 | Performed by: FAMILY MEDICINE

## 2024-05-03 PROCEDURE — 99284 EMERGENCY DEPT VISIT MOD MDM: CPT | Mod: GC | Performed by: FAMILY MEDICINE

## 2024-05-03 PROCEDURE — 96372 THER/PROPH/DIAG INJ SC/IM: CPT | Performed by: FAMILY MEDICINE

## 2024-05-03 RX ORDER — KETOROLAC TROMETHAMINE 30 MG/ML
60 INJECTION, SOLUTION INTRAMUSCULAR; INTRAVENOUS ONCE
Status: COMPLETED | OUTPATIENT
Start: 2024-05-03 | End: 2024-05-03

## 2024-05-03 RX ORDER — OXYCODONE HYDROCHLORIDE 5 MG/1
5 TABLET ORAL EVERY 6 HOURS PRN
Qty: 12 TABLET | Refills: 0 | Status: SHIPPED | OUTPATIENT
Start: 2024-05-03 | End: 2024-05-06

## 2024-05-03 RX ORDER — ORPHENADRINE CITRATE 30 MG/ML
60 INJECTION INTRAMUSCULAR; INTRAVENOUS EVERY 12 HOURS
Status: DISCONTINUED | OUTPATIENT
Start: 2024-05-03 | End: 2024-05-03 | Stop reason: HOSPADM

## 2024-05-03 RX ORDER — IBUPROFEN 800 MG/1
800 TABLET, FILM COATED ORAL EVERY 8 HOURS PRN
Qty: 21 TABLET | Refills: 0 | Status: SHIPPED | OUTPATIENT
Start: 2024-05-03 | End: 2024-05-10

## 2024-05-03 RX ORDER — METHYLPREDNISOLONE 4 MG
TABLET, DOSE PACK ORAL
Qty: 21 TABLET | Refills: 0 | Status: SHIPPED | OUTPATIENT
Start: 2024-05-03 | End: 2024-05-13

## 2024-05-03 RX ADMIN — KETOROLAC TROMETHAMINE 60 MG: 30 INJECTION, SOLUTION INTRAMUSCULAR at 16:54

## 2024-05-03 RX ADMIN — ORPHENADRINE CITRATE 60 MG: 60 INJECTION INTRAMUSCULAR; INTRAVENOUS at 16:56

## 2024-05-03 ASSESSMENT — ACTIVITIES OF DAILY LIVING (ADL)
ADLS_ACUITY_SCORE: 35
ADLS_ACUITY_SCORE: 35

## 2024-05-03 ASSESSMENT — COLUMBIA-SUICIDE SEVERITY RATING SCALE - C-SSRS
2. HAVE YOU ACTUALLY HAD ANY THOUGHTS OF KILLING YOURSELF IN THE PAST MONTH?: NO
1. IN THE PAST MONTH, HAVE YOU WISHED YOU WERE DEAD OR WISHED YOU COULD GO TO SLEEP AND NOT WAKE UP?: NO
6. HAVE YOU EVER DONE ANYTHING, STARTED TO DO ANYTHING, OR PREPARED TO DO ANYTHING TO END YOUR LIFE?: NO

## 2024-05-03 NOTE — ED TRIAGE NOTES
Left leg pain since early Sunday morning. Went out Saturday night to see a band. Pain starts in the lower back/buttock area and radiates down the left leg. Denies any known injury.      Triage Assessment (Adult)       Row Name 05/03/24 1526          Triage Assessment    Airway WDL WDL        Respiratory WDL    Respiratory WDL WDL        Skin Circulation/Temperature WDL    Skin Circulation/Temperature WDL WDL        Cardiac WDL    Cardiac WDL WDL        Peripheral/Neurovascular WDL    Peripheral Neurovascular WDL WDL        Cognitive/Neuro/Behavioral WDL    Cognitive/Neuro/Behavioral WDL WDL                     
No

## 2024-05-03 NOTE — DISCHARGE INSTRUCTIONS
We are glad that you are feeling better after your Toradol and Norflex injection.  Take ibuprofen up to 800 mg 3 times a day with food for the next 3-7 days.  Take Medrol Dosepak as directed for 7 days with meals.  Reserve oxycodone for severe breakthrough pain especially at nighttime.  Follow-up with your primary care provider in 7-10 days if not improving.

## 2024-05-10 NOTE — PROGRESS NOTES
[facility] Neurosurgery  Neurosurgery Clinic Visit      CC: Acute left radiculopathy    Primary care Provider: No Ref-Primary, Physician      HPI: Thad Guerra is a 58 year old male presents to NSGY clinic today for evaluation of acute left low back pain with radiculopathy.     Patient previously seen by NSGY clinic in 2021 for progressively worsening left low back and left posterior thigh pain following accident of that year. Lumbar MRI was obtained and reviewed with patient at that time. Patient requested referral to [facility] for exploration of medical pain management. Providers at [facility] recommended injection, however patient declined due to intense fear of needles. Patient has been having ongoing left leg pain with recent exacerbation.     Patient recently evaluated in ED 5/3/2024 for acute left low back pain that began approximately 1 week prior to visit without precipitating event/trauma.  No imaging was obtained during ED visit and patient was discharged with MDP, Ibuprofen, Oxycodone, and NSGY referral. Patient states he did not take MDP due to increased anxiety after previously being prescribed this medication.     Patient currently has pain stemming from left low back radiating to left buttock, anterior thigh, anterior shin with associated paresthesias.  Patient notes left lower extremity paresthesias have been ongoing for years.  Patient states he currently has to sleep in the fetal position with multiple pillows between his legs to be comfortable.  Notes no improvement of symptoms with Tylenol/ibuprofen, however has mild improvement of symptoms with oxycodone.  Denies bowel/bladder dysfunction, saddle anesthesia, recent fall/trauma.  Patient recently notes his gait has changed due to his left leg pain.  Exacerbation of left leg pain when leaning to the left.  Patient has not completed any physical therapy directed at his back or left leg.    Patient expresses extensive concern regarding fear of  needles and fear of back surgery.  Patient has a friend who underwent back surgery with worsening of symptoms following.  Patient continues to express concern about wanting to try PT, MDP, oral pain medications prior to discussing surgical intervention with Dr. Schwartz.      Past Medical History:   Diagnosis Date    Bell's palsy        Past Medical History reviewed with patient during visit.    No past surgical history on file.  Past Surgical History reviewed with patient during visit.    Current Outpatient Medications   Medication Sig Dispense Refill    oxyCODONE (ROXICODONE) 5 MG tablet Take 1 tablet (5 mg) by mouth every 6 hours as needed for moderate pain or moderate to severe pain 30 tablet 0     No current facility-administered medications for this visit.       No Known Allergies    Social History     Socioeconomic History    Marital status:    Tobacco Use    Smoking status: Never    Smokeless tobacco: Never   Substance and Sexual Activity    Alcohol use: Yes    Drug use: Yes     Types: Marijuana    Sexual activity: Not Currently     Social Determinants of Health      [facility]    Social Connections       No family history on file.      ROS: 10 point ROS neg other than the symptoms noted above in the HPI.    Vital Signs:   /78   Pulse 80   Temp 97  F (36.1  C) (Temporal)   Resp 16   Wt 269 lb (122 kg)   BMI 37.52 kg/m        Examination:  Constitutional:  Alert, well nourished, Anxious.   Memory: recent and remote memory   HEENT: Normocephalic, atraumatic.   Pulm:  Without shortness of breath   CV:  No pitting edema of BLE.      Neurological:  Awake  Alert  Oriented x 3  Speech clear    Motor exam:   Hip Flexion:                 Right: 5/5  Left:  5/5 (some difficulty with hip flexion which patient correlates to low back/left leg pain)  Knee Flexion:              Right:  5/5  Left:  5/5  Knee  Extension:          Right:  5/5  Left:  5/5  Plantar Flexion:           Right:  5/5  Left:  5/5  Dorsal Flexion:            Right:  5/5  Left:  5/5  EHL:                            Right:  5/5  Left:  5/5     Sensation normal to bilateral lower extremities  Antalgic gait  Able to heel/toe walk without loss of balance    Lumbar examination reveals no tenderness of the spine or paraspinous muscles.    Negative straight leg raise bilaterally      Imaging:   Imaging Interpretation provided by radiologist.   MRI LUMBAR SPINE ROUTINE WITHOUT CONTRAST   COMPARISON:   March 15, 2021     FINDINGS:   No listhesis.  No suspicious marrow signal.  No acute fracture or focal   destructive osseous lesion. The distal spinal cord and conus medullaris are   normal MR signal intensity and caliber.  The conus  terminates at theL1   vertebral body level.  Cauda equina is unremarkable.  Cyst is seen within the   right kidney.  Prevertebral and paraspinal soft tissues are otherwise   unremarkable.     Levels:   T12-L1: Shallow broad-based disc bulge without significant neural foraminal or   spinal canal stenosis.     L1-L2: Minimal disc desiccation.  No significant disc bulge.  No significant   spinal canal or foraminal stenosis.     L2-L3: Broad-based disc bulge resulting in moderate bilateral neural foraminal   narrowing.  Uncovertebral hypertrophy and mild facet arthropathy contribute to   minimal spinal canal stenosis.     L3-L4: Broad-based disc bulge resulting in moderate bilateral neural foraminal   narrowing.  Uncovertebral hypertrophy and facet arthropathy contribute to   minimal spinal canal stenosis.  Small central annular fissure.     L4-L5: Broad-based disc bulge resulting in moderate left and severe right   neural foraminal narrowing.  There is an associated disc extrusion on the right   extending from the right paracentral region extending inferolaterally measuring   approximately 1.4 cm.  The extrusion impinges on the  transiting and exiting   right L5 nerve root.  Uncovertebral hypertrophy and facet arthropathy   contribute to mild spinal canal stenosis.     L5-S1: Broad-based disc bulge resulting in severe left and mild-to-moderate   right neural foraminal narrowing.  The bulge abuts the transiting left S1 nerve   root.  Bilateral facet arthropathy.  No significant central canal stenosis.     IMPRESSION:   1. Multilevel degenerative change as described above.  Findings are most   pronounced at L4-L5 with broad-based disc bulge resulting in moderate left and   severe right neural foraminal narrowing.  Additionally, a disc extrusion at   this level extends inferiorly and impinges on the transiting and exiting right   L5 nerve root.   2. Severe left neural foraminal narrowing at L5-S1.       XR lumbar spine 2/3 views 5/13/2024  Imaging Interpretation provided by radiologist.  Final radiologist report pending.  See patient's chart for further details.      Assessment/Plan:   58-year-old male presents to NSGY clinic for evaluation of acute on chronic left low back pain with radiculopathy without precipitating event/trauma.  Patient previously evaluated by NSGY clinic in 2021 for similar symptoms, patient asked for [facility] referral at time for further medical management.  [facility]iders [facility] recommended injection however patient declined due to intense fear of needles.  Patient's recent exacerbation of pain stems from left low back radiating to left buttock, anterior thigh, shin with associated paresthesias.  Patient recently noted change in his gait due to left lower extremity pain.  Patient was evaluated in the ED and discharged with oxycodone, ibuprofen, MDP, NSGY referral.  Patient did not take MDP due to increased anxiety after previously being prescribed this. Does note mild improvement of symptoms with oxycodone.  Has not attended any physical therapy for low back/leg pain.   Lumbar MRI from 2021 demonstrates L4-L5 broad-based  disc bulge resulting in moderate left foraminal stenosis, L5-S1 broad-based disc bulge resulting in severe left foraminal narrowing, disc bulge abutting the traversing left S1 nerve root.  Lumbar MRI reviewed and discussed with patient.  Patient expresses extensive concern regarding fear of needles and fear of back surgery. Patient repeatedly expresses concern about wanting to try PT, MDP, oral pain medications prior to discussing surgical intervention with Dr. Schwartz.      Plan:  -Lumbar x-ray today, will contact patient if abnormal findings.   -Provided oxycodone refill, patient does not currently have a PCP  -Advised patient to take MDP as previously directed, previously prescribed by ED provider however patient did not take  -Ibuprofen/Tylenol as needed  - Physical therapy referral placed  -Advised patient to contact clinic if he should not have improvement of symptoms within next 1 to 2 weeks or if he should have new or worsening symptoms or development of left lower extremity weakness.  Will check in on patient next week, if patient is not doing better will discuss obtaining advanced imaging versus scheduling with Dr. Schwartz.  I do not think patient is an ideal candidate for a lumbar injection as he has intense fear of needles and would have difficulty lying still during the procedure.    Call the clinic at [phone] for any other questions and concerns.    Patient verbalized understanding and is in agreement with the plan.    Hannah Turner PA-C  [facility]  [address] [address]

## 2024-05-13 ENCOUNTER — ANCILLARY PROCEDURE (OUTPATIENT)
Dept: GENERAL RADIOLOGY | Facility: CLINIC | Age: 59
End: 2024-05-13
Payer: COMMERCIAL

## 2024-05-13 ENCOUNTER — OFFICE VISIT (OUTPATIENT)
Dept: NEUROSURGERY | Facility: CLINIC | Age: 59
End: 2024-05-13
Payer: COMMERCIAL

## 2024-05-13 VITALS
BODY MASS INDEX: 37.52 KG/M2 | HEART RATE: 80 BPM | RESPIRATION RATE: 16 BRPM | WEIGHT: 269 LBS | TEMPERATURE: 97 F | DIASTOLIC BLOOD PRESSURE: 78 MMHG | SYSTOLIC BLOOD PRESSURE: 124 MMHG

## 2024-05-13 DIAGNOSIS — M54.42 ACUTE LEFT-SIDED LOW BACK PAIN WITH LEFT-SIDED SCIATICA: ICD-10-CM

## 2024-05-13 DIAGNOSIS — M54.42 ACUTE LEFT-SIDED LOW BACK PAIN WITH LEFT-SIDED SCIATICA: Primary | ICD-10-CM

## 2024-05-13 DIAGNOSIS — F41.9 ANXIOUSNESS: ICD-10-CM

## 2024-05-13 PROCEDURE — G2211 COMPLEX E/M VISIT ADD ON: HCPCS

## 2024-05-13 PROCEDURE — 72100 X-RAY EXAM L-S SPINE 2/3 VWS: CPT | Mod: TC | Performed by: RADIOLOGY

## 2024-05-13 PROCEDURE — 99214 OFFICE O/P EST MOD 30 MIN: CPT

## 2024-05-13 RX ORDER — OXYCODONE HYDROCHLORIDE 5 MG/1
5 TABLET ORAL EVERY 6 HOURS PRN
Qty: 30 TABLET | Refills: 0 | Status: SHIPPED | OUTPATIENT
Start: 2024-05-13 | End: 2024-06-13

## 2024-05-13 ASSESSMENT — PAIN SCALES - GENERAL: PAINLEVEL: SEVERE PAIN (6)

## 2024-05-13 NOTE — LETTER
5/13/2024         RE: Thad Guerra  [address]  [address]  [facility] [address]        Dear Colleague,    Thank you for referring your patient, Thad Guerra, to the [facility] [place]. Please see a copy of my visit note below.    [facility]  Neurosurgery Clinic Visit      CC: Acute left radiculopathy    Primary care Provider: Barbi Ref-Primary, Physician      HPI: Thad Guerra is a 58 year old male presents to NSGY clinic today for evaluation of acute left low back pain with radiculopathy.     Patient previously seen by NSGY clinic in 2021 for progressively worsening left low back and left posterior thigh pain following accident of that year. Lumbar MRI was obtained and reviewed with patient at that time. Patient requested referral to [facility] for exploration of medical pain management. Providers at [facility] recommended injection, however patient declined due to intense fear of needles. Patient has been having ongoing left leg pain with recent exacerbation.     Patient recently evaluated in ED 5/3/2024 for acute left low back pain that began approximately 1 week prior to visit without precipitating event/trauma.  No imaging was obtained during ED visit and patient was discharged with MDP, Ibuprofen, Oxycodone, and NSGY referral. Patient states he did not take MDP due to increased anxiety after previously being prescribed this medication.     Patient currently has pain stemming from left low back radiating to left buttock, anterior thigh, anterior shin with associated paresthesias.  Patient notes left lower extremity paresthesias have been ongoing for years.  Patient states he currently has to sleep in the fetal position with multiple pillows between his legs to be comfortable.  Notes no improvement of symptoms with Tylenol/ibuprofen, however has mild improvement of symptoms with oxycodone.  Denies bowel/bladder dysfunction, saddle anesthesia, recent  fall/trauma.  Patient recently notes his gait has changed due to his left leg pain.  Exacerbation of left leg pain when leaning to the left.  Patient has not completed any physical therapy directed at his back or left leg.    Patient expresses extensive concern regarding fear of needles and fear of back surgery.  Patient has a friend who underwent back surgery with worsening of symptoms following.  Patient continues to express concern about wanting to try PT, MDP, oral pain medications prior to discussing surgical intervention with Dr. Schwartz.      Past Medical History:   Diagnosis Date     Bell's palsy        Past Medical History reviewed with patient during visit.    No past surgical history on file.  Past Surgical History reviewed with patient during visit.    Current Outpatient Medications   Medication Sig Dispense Refill     oxyCODONE (ROXICODONE) 5 MG tablet Take 1 tablet (5 mg) by mouth every 6 hours as needed for moderate pain or moderate to severe pain 30 tablet 0     No current facility-administered medications for this visit.       No Known Allergies    Social History     Socioeconomic History     Marital status:    Tobacco Use     Smoking status: Never     Smokeless tobacco: Never   Substance and Sexual Activity     Alcohol use: Yes     Drug use: Yes     Types: Marijuana     Sexual activity: Not Currently     Social Determinants of Health      Received from [facility]    Social Connections       No family history on file.      ROS: 10 point ROS neg other than the symptoms noted above in the HPI.    Vital Signs:   /78   Pulse 80   Temp 97  F (36.1  C) (Temporal)   Resp 16   Wt 269 lb (122 kg)   BMI 37.52 kg/m        Examination:  Constitutional:  Alert, well nourished, Anxious.   Memory: recent and remote memory   HEENT: Normocephalic, atraumatic.   Pulm:  Without shortness of breath   CV:  No pitting edema of  BLE.      Neurological:  Awake  Alert  Oriented x 3  Speech clear    Motor exam:   Hip Flexion:                 Right: 5/5  Left:  5/5 (some difficulty with hip flexion which patient correlates to low back/left leg pain)  Knee Flexion:              Right:  5/5  Left:  5/5  Knee Extension:          Right:  5/5  Left:  5/5  Plantar Flexion:           Right:  5/5  Left:  5/5  Dorsal Flexion:            Right:  5/5  Left:  5/5  EHL:                            Right:  5/5  Left:  5/5     Sensation normal to bilateral lower extremities  Antalgic gait  Able to heel/toe walk without loss of balance    Lumbar examination reveals no tenderness of the spine or paraspinous muscles.    Negative straight leg raise bilaterally      Imaging:   Imaging Interpretation provided by radiologist.   MRI LUMBAR SPINE ROUTINE WITHOUT CONTRAST   COMPARISON:   March 15, 2021     FINDINGS:   No listhesis.  No suspicious marrow signal.  No acute fracture or focal   destructive osseous lesion. The distal spinal cord and conus medullaris are   normal MR signal intensity and caliber.  The conus  terminates at theL1   vertebral body level.  Cauda equina is unremarkable.  Cyst is seen within the   right kidney.  Prevertebral and paraspinal soft tissues are otherwise   unremarkable.     Levels:   T12-L1: Shallow broad-based disc bulge without significant neural foraminal or   spinal canal stenosis.     L1-L2: Minimal disc desiccation.  No significant disc bulge.  No significant   spinal canal or foraminal stenosis.     L2-L3: Broad-based disc bulge resulting in moderate bilateral neural foraminal   narrowing.  Uncovertebral hypertrophy and mild facet arthropathy contribute to   minimal spinal canal stenosis.     L3-L4: Broad-based disc bulge resulting in moderate bilateral neural foraminal   narrowing.  Uncovertebral hypertrophy and facet arthropathy contribute to   minimal spinal canal stenosis.  Small central annular fissure.     L4-L5: Broad-based  disc bulge resulting in moderate left and severe right   neural foraminal narrowing.  There is an associated disc extrusion on the right   extending from the right paracentral region extending inferolaterally measuring   approximately 1.4 cm.  The extrusion impinges on the transiting and exiting   right L5 nerve root.  Uncovertebral hypertrophy and facet arthropathy   contribute to mild spinal canal stenosis.     L5-S1: Broad-based disc bulge resulting in severe left and mild-to-moderate   right neural foraminal narrowing.  The bulge abuts the transiting left S1 nerve   root.  Bilateral facet arthropathy.  No significant central canal stenosis.     IMPRESSION:   1. Multilevel degenerative change as described above.  Findings are most   pronounced at L4-L5 with broad-based disc bulge resulting in moderate left and   severe right neural foraminal narrowing.  Additionally, a disc extrusion at   this level extends inferiorly and impinges on the transiting and exiting right   L5 nerve root.   2. Severe left neural foraminal narrowing at L5-S1.       XR lumbar spine 2/3 views 5/13/2024  Imaging Interpretation provided by radiologist.  Final radiologist report pending.  See patient's chart for further details.      Assessment/Plan:   58-year-old male presents to NSGY clinic for evaluation of acute on chronic left low back pain with radiculopathy without precipitating event/trauma.  Patient previously evaluated by NSGY clinic in 2021 for similar symptoms, patient asked for [facility] referral at time for further medical management.  Providers at [facility] recommended injection however patient declined due to intense fear of needles.  Patient's recent exacerbation of pain stems from left low back radiating to left buttock, anterior thigh, shin with associated paresthesias.  Patient recently noted change in his gait due to left lower extremity pain.  Patient was evaluated in the ED and discharged with oxycodone, ibuprofen, MDP, NSGY  referral.  Patient did not take MDP due to increased anxiety after previously being prescribed this. Does note mild improvement of symptoms with oxycodone.  Has not attended any physical therapy for low back/leg pain.   Lumbar MRI from 2021 demonstrates L4-L5 broad-based disc bulge resulting in moderate left foraminal stenosis, L5-S1 broad-based disc bulge resulting in severe left foraminal narrowing, disc bulge abutting the traversing left S1 nerve root.  Lumbar MRI reviewed and discussed with patient.  Patient expresses extensive concern regarding fear of needles and fear of back surgery. Patient repeatedly expresses concern about wanting to try PT, MDP, oral pain medications prior to discussing surgical intervention with Dr. Schwartz.      Plan:  -Lumbar x-ray today, will contact patient if abnormal findings.   -Provided oxycodone refill, patient does not currently have a PCP  -Advised patient to take MDP as previously directed, previously prescribed by ED provider however patient did not take  -Ibuprofen/Tylenol as needed  - Physical therapy referral placed  -Advised patient to contact clinic if he should not have improvement of symptoms within next 1 to 2 weeks or if he should have new or worsening symptoms or development of left lower extremity weakness.  Will check in on patient next week, if patient is not doing better will discuss obtaining advanced imaging versus scheduling with Dr. Schwartz.  I do not think patient is an ideal candidate for a lumbar injection as he has intense fear of needles and would have difficulty lying still during the procedure.    Call the clinic at [phone] for any other questions and concerns.    Patient verbalized understanding and is in agreement with the plan.    Hannah Turner PA-C  [facility]  [place] [address]        Again, thank you for allowing me to participate in the care of your patient.         Sincerely,        Hannah Turner PA-C

## 2024-05-13 NOTE — NURSING NOTE
"Thad Guerra is a 58 year old male who presents for:  Chief Complaint   Patient presents with    Neurologic Problem     Low back pain          Initial Vitals:  /78   Pulse 80   Temp 97  F (36.1  C) (Temporal)   Resp 16   Wt 269 lb (122 kg)   BMI 37.52 kg/m   Estimated body mass index is 37.52 kg/m  as calculated from the following:    Height as of 5/3/24: 5' 11\" (1.803 m).    Weight as of this encounter: 269 lb (122 kg).. Body surface area is 2.47 meters squared. BP completed using cuff size: large  Severe Pain (6)    Nursing Comments:     Nat Russo    "

## 2024-05-13 NOTE — PATIENT INSTRUCTIONS
Physical therapy referral placed for your back and leg pain - schedulers will contact you in the next 1-2 buisness days; if not contact please contact the number below to schedule an appointment.      Medrol dose pack -  Please take as directed     Okay to take Ibuprofen up to 800mg three times per day. Tylenol up to 1000mg three times per day.    Okay to continue to use Tylenol or Ibuprofen as needed.   Recommend alternating heat/ice, massage, light stretching.     Oxycodone 5mg prescription provided today, take as directed.     If no improvement or worsening of symptoms in next 1-2 weeks, please contact clinic to discuss next steps.      Please contact our clinic via [employer] [facility] or telephone ([phone]) for questions, concerns, or for scheduling assistance.

## 2024-05-16 ENCOUNTER — THERAPY VISIT (OUTPATIENT)
Dept: PHYSICAL THERAPY | Facility: CLINIC | Age: 59
End: 2024-05-16
Payer: COMMERCIAL

## 2024-05-16 DIAGNOSIS — M54.42 ACUTE LEFT-SIDED LOW BACK PAIN WITH LEFT-SIDED SCIATICA: ICD-10-CM

## 2024-05-16 PROCEDURE — 97530 THERAPEUTIC ACTIVITIES: CPT | Mod: GP | Performed by: PHYSICAL THERAPIST

## 2024-05-16 PROCEDURE — 97110 THERAPEUTIC EXERCISES: CPT | Mod: GP | Performed by: PHYSICAL THERAPIST

## 2024-05-16 PROCEDURE — 97161 PT EVAL LOW COMPLEX 20 MIN: CPT | Mod: GP | Performed by: PHYSICAL THERAPIST

## 2024-05-16 NOTE — PROGRESS NOTES
"PHYSICAL THERAPY EVALUATION  Type of Visit: Evaluation    See electronic medical record for Abuse and Falls Screening details.    Subjective Patient has LBP that started on 4/30/24 after standing watching a band the night before. Also had increase in LLE pain. Had LLE pain and LBP that started in 2021 was getting better off and on until 4/30/24. Started on the prednisone yesterday am. LBP #6 off to the left. LLE pain to the posterior thigh #4. Pain worse with sitting and standing, has to limp on the LLE. Sleeps in fetal position on the right. No other PMHX.       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   Hobbies/Interests:   traveling    Patient goals for therapy:   To have no pain and be able work and travel         Objective   LUMBAR SPINE EVALUATION  PAIN: in standing LBP #6 off to the left and the LLE pain to the mid posterior thigh #4    POSTURE:  Poor and keeps slouching to \"stretch\" the LB  GAIT:   Limping on the left LE and turns it out to walk  BALANCE/PROPRIOCEPTION:  poor on the left    ROM:  FF 70%, ext WNL    STRENGTH:  all WNL in the RLE, LLE: DF 3-, hallux ext 3-, knee ext 3+    DTR S: WNL    NEURAL TENSION: Positive for sciatic on the left  FLEXIBILITY:  poor in the LB, limited by pain    PELVIS/SI SPECIAL TESTS: Finding pt pain relieving position is prone and VERONIKA, this almost eliminated the LLE pain    PALPATION: Pain increase in L5-S1 area off to the left      Assessment & Plan   CLINICAL IMPRESSIONS  Medical Diagnosis: Acute left-sided low back pain with left-sided sciatica    Treatment Diagnosis: LBP radiculopathy LLE, weakness in the LLE "   Impression/Assessment:  Patient has moderate to severe pain in the left LB and into the LLE. Patient is limping on the left and has decrease in left DF, great toe extension and knee extension.     Clinical Decision Making (Complexity):  Clinical Presentation: Evolving/Changing  Clinical Presentation Rationale: based on medical and personal factors listed in PT evaluation  Clinical Decision Making (Complexity): Low complexity    PLAN OF CARE  Treatment Interventions:  Modalities: Cryotherapy, E-stim  Interventions: Gait Training, Manual Therapy, Neuromuscular Re-education, Therapeutic Activity, Therapeutic Exercise, Self-Care/Home Management    Long Term Goals     PT Goal 1  Goal Identifier: 1  Goal Description: Patient has 50% decrease in LBP and 50% increase in function as shown by a BRENDAN score of 28%  Target Date: 07/25/24  PT Goal 2  Goal Identifier: 2  Goal Description: Patient is able to walk without pain in the LLE and no weakness, thus no limping and having to turn the LLE outward to walk  Target Date: 07/25/24      Frequency of Treatment: 1x per wk  Duration of Treatment: 10 wks      Education Assessment:   Learner/Method: Patient;Listening;Reading;Demonstration;Pictures/Video    Risks and benefits of evaluation/treatment have been explained.   Patient/Family/caregiver agrees with Plan of Care.     Evaluation Time:     PT Eval, Low Complexity Minutes (04820): 15       Signing Clinician: Alla Chatterjee, PT      [facility]                                                                                   OUTPATIENT PHYSICAL THERAPY      PLAN OF TREATMENT FOR OUTPATIENT REHABILITATION   Patient's Last Name, First Name, Thad Dias YOB: 1965   Provider's Name   [facility]   Medical Record No.  [id number]     Onset Date: 04/30/24  Start of Care Date: 05/16/24     Medical Diagnosis:  Acute left-sided low back pain with  left-sided sciatica      PT Treatment Diagnosis:  LBP radiculopathy LLE, weakness in the LLE Plan of Treatment  Frequency/Duration: 1x per wk/ 10 wks    Certification date from 05/16/24 to 07/25/24         See note for plan of treatment details and functional goals     Alla Chatterjee, PT                         I CERTIFY THE NEED FOR THESE SERVICES FURNISHED UNDER        THIS PLAN OF TREATMENT AND WHILE UNDER MY CARE     (Physician attestation of this document indicates review and certification of the therapy plan).              Referring Provider:  Hannah Turner    Initial Assessment  See Epic Evaluation- Start of Care Date: 05/16/24

## 2024-06-06 ENCOUNTER — TELEPHONE (OUTPATIENT)
Dept: NEUROSURGERY | Facility: CLINIC | Age: 59
End: 2024-06-06
Payer: COMMERCIAL

## 2024-06-06 NOTE — TELEPHONE ENCOUNTER
"Received staff message from Hannah Turner PA-C requesting nursing reach out to patient to reassess symptoms as patient has started PT. Patient had PT appt on 5/16 and is scheduled for next appt on 6/13.    Called patient to discuss. Patient states he is \"not doing good.\" Patient reports pain is the same as it was at last OV. Pain is worst in the morning and at night.     Patient reports 1st PT session was just an assessment so he has not felt any relief yet. Patient reports no relief from MDP.     Patient is taking oxycodone approx every other day for severe pain before bed. He is also taking ibuprofen during the day.     Advised a message will be sent to provider to update. Advised patient to contact clinic after next PT appointment to provide updates. Patient voiced agreement and understanding. Encounter routed to provider.   "

## 2024-06-07 ENCOUNTER — TELEPHONE (OUTPATIENT)
Dept: NEUROSURGERY | Facility: CLINIC | Age: 59
End: 2024-06-07
Payer: COMMERCIAL

## 2024-06-07 NOTE — TELEPHONE ENCOUNTER
Reason for Call:  Other Request for letter     Detailed comments: Thad was summoned for jury duty, he said due to his back pain he is not able to sit for any length of time, he is requesting a letter to release him from jury duty due to his back pain. Thad would like to pick the letter from the [facility]. Please notify when the letter is ready to be picked up.    Phone Number Patient can be reached at: Home number on file [phone] (home)    Best Time:     Can we leave a detailed message on this number? YES    Call taken on 6/7/2024 at 2:07 PM by Rere Narayanan

## 2024-06-07 NOTE — TELEPHONE ENCOUNTER
Printed letter and placed at the , lower level specialty clinic.    Trisha Kurtz RN on 6/7/2024 at 3:34 PM

## 2024-06-07 NOTE — LETTER
06/07/24     To Whom it May Concern,      Thad Guerra is being seen at our clinic for spinal care. He should be excused from jury duty due to his current symptoms.     Please call our clinic with questions or concerns: [phone]       Sincerely,    Hannah Turner PA-C

## 2024-06-07 NOTE — TELEPHONE ENCOUNTER
OK to write letter per Hannah Turner PA-C.     Letter written. Attempted to call patient x2 to provide update, no answer and VM full.     Call forwarded to PH RN for printing.

## 2024-06-13 ENCOUNTER — TELEPHONE (OUTPATIENT)
Dept: NEUROSURGERY | Facility: CLINIC | Age: 59
End: 2024-06-13

## 2024-06-13 ENCOUNTER — THERAPY VISIT (OUTPATIENT)
Dept: PHYSICAL THERAPY | Facility: CLINIC | Age: 59
End: 2024-06-13
Payer: COMMERCIAL

## 2024-06-13 DIAGNOSIS — M54.42 ACUTE LEFT-SIDED LOW BACK PAIN WITH LEFT-SIDED SCIATICA: Primary | ICD-10-CM

## 2024-06-13 PROCEDURE — 97014 ELECTRIC STIMULATION THERAPY: CPT | Mod: GP | Performed by: PHYSICAL THERAPIST

## 2024-06-13 PROCEDURE — 97110 THERAPEUTIC EXERCISES: CPT | Mod: GP | Performed by: PHYSICAL THERAPIST

## 2024-06-13 RX ORDER — OXYCODONE HYDROCHLORIDE 5 MG/1
5 TABLET ORAL EVERY 6 HOURS PRN
Qty: 30 TABLET | Refills: 0 | Status: SHIPPED | OUTPATIENT
Start: 2024-06-13 | End: 2024-10-07

## 2024-06-13 NOTE — TELEPHONE ENCOUNTER
Pt is out of pain medication. Pt has an upcoming MRI and appt with MD Efren.    Pt is struggling to sleep, due to pt's pain level.    Pt stated pain level of 6/10 during the day, and 8/10 at night.    Please CALL pt to discuss pt's pain concerns and plan of care. Thank you.

## 2024-06-13 NOTE — TELEPHONE ENCOUNTER
Hannah Turner PA-C received a message from physical therapy noting patient is not progressing with PT and is having LLE weakness / worsening pain.     Called patient to reassess symptoms per provider request. Patient states he has not improvement in pain. He is still feeling a lot of aching pain. He has not been able to sleep well due to pain.     Hannah recommends lumbar MRI and schedule with Dr. Schwartz to discuss surgical intervention. Per Hannah, patient is not a candidate for injection as he has a severe fear of needles.     Order placed for MRI. Patient states he is claustrophobic but able to tolerate MRI is his head is not in the machine. He tolerated last MRI ok. Offered valium as an option. Patient declined. Advised to contact clinic if unable to complete MRI. Advised scheduling team will contact patient to schedule MRI and follow-up appointment with Dr. Schwartz.     Patient requesting refill of oxycodone. Patient was provided oxycodone #30 on 5/15 by Hannah Turner PA-C. Patient does not have PCP. Advised a message will sent to provider for review.

## 2024-06-14 NOTE — TELEPHONE ENCOUNTER
Per chart review patient spoke with our team yesterday and requested oxycodone refill.     Hannah Turner PA-C approved this request, E-Prescribing Status: Receipt confirmed by pharmacy (6/13/2024  2:55 PM CDT).    Patient scheduled for MRI and to see Dr. Schwartz 6/27.    Attempted to reach out to patient, no answer. Left voice message for them to call clinic back to further discuss.

## 2024-06-18 ENCOUNTER — TELEPHONE (OUTPATIENT)
Dept: NEUROSURGERY | Facility: CLINIC | Age: 59
End: 2024-06-18
Payer: COMMERCIAL

## 2024-06-18 NOTE — TELEPHONE ENCOUNTER
Writer attempted to reach  patient to reschedule MRI at least one day prior to appointment with dr. Schwartz. Imaging department may not be able to process in time if done the same day as the clinic.    Not able to leave a voicemail message as messages box was full. Patient does not have My chart for messaging as well.

## 2024-06-20 ENCOUNTER — THERAPY VISIT (OUTPATIENT)
Dept: PHYSICAL THERAPY | Facility: CLINIC | Age: 59
End: 2024-06-20
Payer: COMMERCIAL

## 2024-06-20 DIAGNOSIS — M54.42 ACUTE LEFT-SIDED LOW BACK PAIN WITH LEFT-SIDED SCIATICA: Primary | ICD-10-CM

## 2024-06-20 PROCEDURE — 97014 ELECTRIC STIMULATION THERAPY: CPT | Mod: GP | Performed by: PHYSICAL THERAPIST

## 2024-06-20 NOTE — TELEPHONE ENCOUNTER
Verbal order to change MRI to stat due to pain and weakness. Order changed, appointment moved to sooner date. Patient updated. Verified Dr. Schwartz  appointment with patient on 6/27/24.    Trisha Kurtz RN on 6/20/2024 at 4:13 PM

## 2024-06-22 ENCOUNTER — HOSPITAL ENCOUNTER (OUTPATIENT)
Dept: MRI IMAGING | Facility: HOSPITAL | Age: 59
Discharge: HOME | End: 2024-06-22
Payer: COMMERCIAL

## 2024-06-22 DIAGNOSIS — M54.42 ACUTE LEFT-SIDED LOW BACK PAIN WITH LEFT-SIDED SCIATICA: ICD-10-CM

## 2024-06-22 PROCEDURE — 72148 MRI LUMBAR SPINE W/O DYE: CPT

## 2024-06-27 ENCOUNTER — OFFICE VISIT (OUTPATIENT)
Dept: NEUROSURGERY | Facility: CLINIC | Age: 59
End: 2024-06-27
Payer: COMMERCIAL

## 2024-06-27 VITALS
HEART RATE: 105 BPM | SYSTOLIC BLOOD PRESSURE: 134 MMHG | WEIGHT: 268 LBS | BODY MASS INDEX: 37.38 KG/M2 | TEMPERATURE: 97.7 F | OXYGEN SATURATION: 95 % | DIASTOLIC BLOOD PRESSURE: 72 MMHG | RESPIRATION RATE: 18 BRPM

## 2024-06-27 DIAGNOSIS — M54.16 LUMBAR RADICULOPATHY: Primary | ICD-10-CM

## 2024-06-27 PROCEDURE — 99213 OFFICE O/P EST LOW 20 MIN: CPT | Performed by: NEUROLOGICAL SURGERY

## 2024-06-27 RX ORDER — OXYCODONE HYDROCHLORIDE 5 MG/1
5 TABLET ORAL EVERY 6 HOURS PRN
Qty: 25 TABLET | Refills: 0 | Status: SHIPPED | OUTPATIENT
Start: 2024-06-27 | End: 2024-10-07

## 2024-06-27 RX ORDER — IBUPROFEN 600 MG/1
600 TABLET, FILM COATED ORAL EVERY 6 HOURS PRN
Qty: 60 TABLET | Refills: 0 | Status: SHIPPED | OUTPATIENT
Start: 2024-06-27

## 2024-06-27 ASSESSMENT — PAIN SCALES - GENERAL: PAINLEVEL: SEVERE PAIN (6)

## 2024-06-27 NOTE — PROGRESS NOTES
Thad Guerra is a 58 year old male presents to NSGY clinic today for evaluation of acute left low back pain with radiculopathy.     Patient previously seen by NSGY clinic in 2021 for progressively worsening left low back and left posterior thigh pain following accident of that year. Lumbar MRI was obtained and reviewed with patient at that time. Patient requested referral to [facility] for exploration of medical pain management. Providers at [facility] recommended injection, however patient declined due to intense fear of needles. Patient has been having ongoing left leg pain with recent exacerbation.     Patient recently evaluated in ED 5/3/2024 for acute left low back pain that began approximately 1 week prior to visit without precipitating event/trauma.  No imaging was obtained during ED visit and patient was discharged with MDP, Ibuprofen, Oxycodone, and NSGY referral. Patient states he did not take MDP due to increased anxiety after previously being prescribed this medication.     Patient currently has pain stemming from left low back radiating to left buttock, anterior thigh, anterior shin with associated paresthesias.  Patient notes left lower extremity paresthesias have been ongoing for years.  Patient states he currently has to sleep in the fetal position with multiple pillows between his legs to be comfortable.  Notes no improvement of symptoms with Tylenol/ibuprofen, however has mild improvement of symptoms with oxycodone.  Denies bowel/bladder dysfunction, saddle anesthesia, recent fall/trauma.  Patient recently notes his gait has changed due to his left leg pain.  Exacerbation of left leg pain when leaning to the left.  Patient has not completed any physical therapy directed at his back or left leg.    Patient expresses extensive concern regarding fear of needles and fear of back surgery.  Patient has a friend who underwent back surgery with worsening of symptoms following.  Patient continues to express  concern about wanting to try PT, MDP, oral pain medications prior to discussing surgical intervention with Dr. Schwartz.    6/27/24:  Returns for follow up.  Continued severe left leg pain radiating to the shin as described above.  MR Lumbar, personally reviewed, with L2-S1 spondylotic change, left L5-S1 disc degeneration and facet hypertrophy contributing to severe left sided foraminal stenosis.      Past Medical History:   Diagnosis Date    Bell's palsy        Past Medical History reviewed with patient during visit.    No past surgical history on file.  Past Surgical History reviewed with patient during visit.    Current Outpatient Medications   Medication Sig Dispense Refill    ibuprofen (ADVIL/MOTRIN) 600 MG tablet Take 1 tablet (600 mg) by mouth every 6 hours as needed for moderate pain 60 tablet 0    oxyCODONE (ROXICODONE) 5 MG tablet Take 1 tablet (5 mg) by mouth every 6 hours as needed for pain 25 tablet 0    oxyCODONE (ROXICODONE) 5 MG tablet Take 1 tablet (5 mg) by mouth every 6 hours as needed for moderate pain or moderate to severe pain 30 tablet 0     No current facility-administered medications for this visit.       No Known Allergies    Social History     Socioeconomic History    Marital status:    Tobacco Use    Smoking status: Never    Smokeless tobacco: Never   Substance and Sexual Activity    Alcohol use: Yes    Drug use: Yes     Types: Marijuana    Sexual activity: Not Currently     Social Determinants of Health      [facility], [facility]    Social Connections       No family history on file.      ROS: 10 point ROS neg other than the symptoms noted above in the HPI.    Vital Signs:   /72   Pulse 105   Temp 97.7  F (36.5  C) (Temporal)   Resp 18   Wt 121.6 kg (268 lb)   SpO2 95%   BMI 37.38 kg/m        Examination:  Constitutional:  Alert, well nourished, Anxious.   Memory: recent and remote memory    HEENT: Normocephalic, atraumatic.   Pulm:  Without shortness of breath   CV:  No pitting edema of BLE.      Neurological:  Awake  Alert  Oriented x 3  Speech clear    Motor exam:   Hip Flexion:                 Right: 5/5  Left:  5/5 (some difficulty with hip flexion which patient correlates to low back/left leg pain)  Knee Flexion:              Right:  5/5  Left:  5/5  Knee Extension:          Right:  5/5  Left:  5/5  Plantar Flexion:           Right:  5/5  Left:  5/5  Dorsal Flexion:            Right:  5/5  Left:  5/5  EHL:                            Right:  5/5  Left:  5/5     Sensation normal to bilateral lower extremities  Antalgic gait  Able to heel/toe walk without loss of balance      Imaging:   Imaging Interpretation provided by radiologist.   MRI LUMBAR SPINE ROUTINE WITHOUT CONTRAST   COMPARISON:   March 15, 2021     FINDINGS:   No listhesis.  No suspicious marrow signal.  No acute fracture or focal   destructive osseous lesion. The distal spinal cord and conus medullaris are   normal MR signal intensity and caliber.  The conus  terminates at theL1   vertebral body level.  Cauda equina is unremarkable.  Cyst is seen within the   right kidney.  Prevertebral and paraspinal soft tissues are otherwise   unremarkable.     Levels:   T12-L1: Shallow broad-based disc bulge without significant neural foraminal or   spinal canal stenosis.     L1-L2: Minimal disc desiccation.  No significant disc bulge.  No significant   spinal canal or foraminal stenosis.     L2-L3: Broad-based disc bulge resulting in moderate bilateral neural foraminal   narrowing.  Uncovertebral hypertrophy and mild facet arthropathy contribute to   minimal spinal canal stenosis.     L3-L4: Broad-based disc bulge resulting in moderate bilateral neural foraminal   narrowing.  Uncovertebral hypertrophy and facet arthropathy contribute to   minimal spinal canal stenosis.  Small central annular fissure.     L4-L5: Broad-based disc bulge resulting in  moderate left and severe right   neural foraminal narrowing.  There is an associated disc extrusion on the right   extending from the right paracentral region extending inferolaterally measuring   approximately 1.4 cm.  The extrusion impinges on the transiting and exiting   right L5 nerve root.  Uncovertebral hypertrophy and facet arthropathy   contribute to mild spinal canal stenosis.     L5-S1: Broad-based disc bulge resulting in severe left and mild-to-moderate   right neural foraminal narrowing.  The bulge abuts the transiting left S1 nerve   root.  Bilateral facet arthropathy.  No significant central canal stenosis.     IMPRESSION:   1. Multilevel degenerative change as described above.  Findings are most   pronounced at L4-L5 with broad-based disc bulge resulting in moderate left and   severe right neural foraminal narrowing.  Additionally, a disc extrusion at   this level extends inferiorly and impinges on the transiting and exiting right   L5 nerve root.   2. Severe left neural foraminal narrowing at L5-S1.       XR lumbar spine 2/3 views 5/13/2024  Imaging Interpretation provided by radiologist.  Final radiologist report pending.  See patient's chart for further details.      Assessment/Plan:     Patient very concerned about injection and surgical options, prefers approach of PT and oral pain management  Will refer for PT/lumbar traction  Pain clinic referral  If he decides to proceed with left L5-S1 transforaminal JA, he will call us and we will order

## 2024-06-27 NOTE — NURSING NOTE
"Thad Guerra is a 58 year old male who presents for:  Chief Complaint   Patient presents with    Neurologic Problem     Results of MRI         Initial Vitals:  /72   Pulse 105   Temp 97.7  F (36.5  C) (Temporal)   Resp 18   Wt 268 lb (121.6 kg)   SpO2 95%   BMI 37.38 kg/m   Estimated body mass index is 37.38 kg/m  as calculated from the following:    Height as of 5/3/24: 5' 11\" (1.803 m).    Weight as of this encounter: 268 lb (121.6 kg).. Body surface area is 2.47 meters squared. BP completed using cuff size: large  Severe Pain (6)    Nursing Comments:     Nat Russo    "

## 2024-06-27 NOTE — PATIENT INSTRUCTIONS
Ordered ibuprofen and oxycodone. Additional pain management will need to come from pain provider.    Referral to [facility] for comprehensive services. They will call you to schedule. If you don't hear from their scheduling office within 2 business days, call  [phone] to schedule.     Orders for [facility] Lumbar Physical Therapy with Traction. They will call you to schedule within a few days. If you have not heard from them, please call [phone]. Please call our clinic if symptoms persist after your course of physical therapy (approximately 4 weeks).     [facility]  [facility] - [address] [address]  Telephone:  [phone]        Fax:  [phone]    Patient Education   Oxycodone Oral Capsule (OXYCODONE - ORAL)  For pain.  Generic Name: Oxycodone  Instructions  This medicine may be taken with or without food.  Swallow with a full glass (8 oz) of water unless your doctor gives you different instructions.  Store at room temperature away from heat, light, and moisture. Do not keep in the bathroom.  Please ask your doctor, nurse, or pharmacist how to discard unused medicines safely.  Only swallow one pill at a time.  To reduce constipation, eat high fiber foods, drink plenty of water and exercise.  Avoid grapefruit juice while on this medicine.  Drug interactions can change how medicines work or increase risk for side effects. Tell your health care providers about all medicines taken. Include prescription and over-the-counter medicines, vitamins, and herbal medicines. Speak with your doctor or pharmacist before starting or stopping any medicine.  Tell your doctor if symptoms do not get better or if they get worse.  Cautions  This medicine has an opioid. Opioids help many people but may cause addiction, especially if used for a long time. The addiction risk is higher if you  have a substance use disorder (overuse of or addiction to drugs or alcohol). Ask your doctor about the benefits and risks.  Ask your doctor or pharmacist if you should have naloxone on hand to treat opioid overdose. Teach your family or household members about the signs of an opioid overdose and how to treat it.  If you stop this medicine suddenly after using it for a long time, you may have withdrawal. Your doctor may slowly lower your dose before stopping it. Tell your doctor right away if you have symptoms, such as unusual sweating, watering eyes, runny nose, chills, diarrhea, yawning, muscle aches, restlessness, anxiety, trouble sleeping, or thoughts of suicide.  Tell your doctor and pharmacist if you ever had an allergic reaction to a medicine.  Do not use the medication any more than instructed.  If possible, avoid using with alcohol, marijuana, or other medicines that can cause dizziness or drowsiness. These include allergy/cold products, muscle relaxers, sleep aids, and pain relievers.  Your ability to stay alert or to react quickly may be impaired by this medicine. Do not drive or operate machinery until you know how this medicine will affect you.  This medicine passes into breast milk. Ask your doctor before breastfeeding.  This medicine can hurt a new baby in the womb. If you become pregnant while on this medicine, tell your doctor immediately. Your doctor may switch you to a different medicine.  This medicine should be used with caution in patients with breathing difficulties.  Call your doctor right away if you notice slow or shallow breathing.  Do not share this medicine with anyone who has not been prescribed this medicine.  Some patients have serious side effects from this medicine. Ask your pharmacist to show you the information from the Food and Drug Administration (FDA) and discuss it with you.  Side Effects  The following is a list of some common side effects from this medicine. Please speak with  your doctor about what you should do if you experience these or other side effects.  decreased appetite  constipation  dizziness or drowsiness  lightheadedness  nausea and vomiting  If you have any of the following side effects, you may be getting too much medicine. Please contact your doctor to let them know about these side effects.  confusion  fainting  unusual or unexplained tiredness or weakness  difficulty or discomfort urinating  Call your doctor or get medical help right away if you notice any of these more serious side effects:  agitated feeling or trouble sleeping  decreased awareness or responsiveness  breathing interruption during sleep  shallow, irregular breathing  changes in memory, mood, or thinking  hallucinations (unusual thoughts, seeing or hearing things that are not real)  seizures  severe stomach or bowel pain  weight loss  A few people may have an allergic reaction to this medicine. Symptoms can include difficulty breathing, skin rash, itching, swelling, or severe dizziness. If you notice any of these symptoms, seek medical help quickly.  Please speak with your doctor, nurse, or pharmacist if you have any questions about this medicine.  IMPORTANT NOTE: This document tells you briefly how to take your medicine, but it does not tell you all there is to know about it. Your doctor or pharmacist may give you other documents about your medicine. Please talk to them if you have any questions. Always follow their advice.  There is a more complete description of this medicine available in English. Scan this code on your smartphone or tablet or use the web address below. You can also ask your pharmacist for a printout. If you have any questions, please ask your pharmacist.  The display and use of this drug information is subject to Terms of Use.  More information about OXYCODONE - ORAL      Copyright(c) 2023 [employer]  Selected from data included with permission and copyright by [employer] This copyrighted material has been downloaded from a licensed data provider and is not for distribution, except as may be authorized by the applicable terms of use.  Conditions of Use: The information in this database is intended to supplement, not substitute for the expertise and judgment of healthcare professionals. The information is not intended to cover all possible uses, directions, precautions, drug interactions or adverse effects nor should it be construed to indicate that use of a particular drug is safe, appropriate or effective for you or anyone else. A healthcare professional should be consulted before taking any drug, changing any diet or commencing or discontinuing any course of treatment. The display and use of this drug information is subject to express Terms of Use.  Care instructions adapted under license by your healthcare professional. If you have questions about a medical condition or this instruction, always ask your healthcare professional. [employer] disclaims any warranty or liability for your use of this information.       Patient Education   Oxycodone Oral Capsule (OXYCODONE - ORAL)  For pain.  Generic Name: Oxycodone  Instructions  This medicine may be taken with or without food.  Swallow with a full glass (8 oz) of water unless your doctor gives you different instructions.  Store at room temperature away from heat, light, and moisture. Do not keep in the bathroom.  Please ask your doctor, nurse, or pharmacist how to discard unused medicines safely.  Only swallow one pill at a time.  To reduce constipation, eat high fiber foods, drink plenty of water and exercise.  Avoid grapefruit juice while on this medicine.  Drug interactions can change how medicines work or increase risk for side effects. Tell your health care providers about all medicines taken. Include prescription and over-the-counter medicines, vitamins, and herbal medicines. Speak with your doctor or  pharmacist before starting or stopping any medicine.  Tell your doctor if symptoms do not get better or if they get worse.  Cautions  This medicine has an opioid. Opioids help many people but may cause addiction, especially if used for a long time. The addiction risk is higher if you have a substance use disorder (overuse of or addiction to drugs or alcohol). Ask your doctor about the benefits and risks.  Ask your doctor or pharmacist if you should have naloxone on hand to treat opioid overdose. Teach your family or household members about the signs of an opioid overdose and how to treat it.  If you stop this medicine suddenly after using it for a long time, you may have withdrawal. Your doctor may slowly lower your dose before stopping it. Tell your doctor right away if you have symptoms, such as unusual sweating, watering eyes, runny nose, chills, diarrhea, yawning, muscle aches, restlessness, anxiety, trouble sleeping, or thoughts of suicide.  Tell your doctor and pharmacist if you ever had an allergic reaction to a medicine.  Do not use the medication any more than instructed.  If possible, avoid using with alcohol, marijuana, or other medicines that can cause dizziness or drowsiness. These include allergy/cold products, muscle relaxers, sleep aids, and pain relievers.  Your ability to stay alert or to react quickly may be impaired by this medicine. Do not drive or operate machinery until you know how this medicine will affect you.  This medicine passes into breast milk. Ask your doctor before breastfeeding.  This medicine can hurt a new baby in the womb. If you become pregnant while on this medicine, tell your doctor immediately. Your doctor may switch you to a different medicine.  This medicine should be used with caution in patients with breathing difficulties.  Call your doctor right away if you notice slow or shallow breathing.  Do not share this medicine with anyone who has not been prescribed this  medicine.  Some patients have serious side effects from this medicine. Ask your pharmacist to show you the information from the Food and Drug Administration (FDA) and discuss it with you.  Side Effects  The following is a list of some common side effects from this medicine. Please speak with your doctor about what you should do if you experience these or other side effects.  decreased appetite  constipation  dizziness or drowsiness  lightheadedness  nausea and vomiting  If you have any of the following side effects, you may be getting too much medicine. Please contact your doctor to let them know about these side effects.  confusion  fainting  unusual or unexplained tiredness or weakness  difficulty or discomfort urinating  Call your doctor or get medical help right away if you notice any of these more serious side effects:  agitated feeling or trouble sleeping  decreased awareness or responsiveness  breathing interruption during sleep  shallow, irregular breathing  changes in memory, mood, or thinking  hallucinations (unusual thoughts, seeing or hearing things that are not real)  seizures  severe stomach or bowel pain  weight loss  A few people may have an allergic reaction to this medicine. Symptoms can include difficulty breathing, skin rash, itching, swelling, or severe dizziness. If you notice any of these symptoms, seek medical help quickly.  Please speak with your doctor, nurse, or pharmacist if you have any questions about this medicine.  IMPORTANT NOTE: This document tells you briefly how to take your medicine, but it does not tell you all there is to know about it. Your doctor or pharmacist may give you other documents about your medicine. Please talk to them if you have any questions. Always follow their advice.  There is a more complete description of this medicine available in English. Scan this code on your smartphone or tablet or use the web address below. You can also ask your pharmacist for a  printout. If you have any questions, please ask your pharmacist.  The display and use of this drug information is subject to Terms of Use.  More information about OXYCODONE - ORAL      Copyright(c) 2023 [employer]  Selected from data included with permission and copyright by [employer]. This copyrighted material has been downloaded from a licensed data provider and is not for distribution, except as may be authorized by the applicable terms of use.  Conditions of Use: The information in this database is intended to supplement, not substitute for the expertise and judgment of healthcare professionals. The information is not intended to cover all possible uses, directions, precautions, drug interactions or adverse effects nor should it be construed to indicate that use of a particular drug is safe, appropriate or effective for you or anyone else. A healthcare professional should be consulted before taking any drug, changing any diet or commencing or discontinuing any course of treatment. The display and use of this drug information is subject to express Terms of Use.  Care instructions adapted under license by your healthcare professional. If you have questions about a medical condition or this instruction, always ask your healthcare professional. [employer] disclaims any warranty or liability for your use of this information.

## 2024-06-27 NOTE — LETTER
6/27/2024      Thad Guerra  [address] [address]  [address]  [facility] [address]      Dear Colleague,    Thank you for referring your patient, Thad Guerra, to the [facility] [place]. Please see a copy of my visit note below.    Thad Guerra is a 58 year old male presents to NSGY clinic today for evaluation of acute left low back pain with radiculopathy.     Patient previously seen by NSGY clinic in 2021 for progressively worsening left low back and left posterior thigh pain following accident of that year. Lumbar MRI was obtained and reviewed with patient at that time. Patient requested referral to [facility] for exploration of medical pain management. Providers at [facility] recommended injection, however patient declined due to intense fear of needles. Patient has been having ongoing left leg pain with recent exacerbation.     Patient recently evaluated in ED 5/3/2024 for acute left low back pain that began approximately 1 week prior to visit without precipitating event/trauma.  No imaging was obtained during ED visit and patient was discharged with MDP, Ibuprofen, Oxycodone, and NSGY referral. Patient states he did not take MDP due to increased anxiety after previously being prescribed this medication.     Patient currently has pain stemming from left low back radiating to left buttock, anterior thigh, anterior shin with associated paresthesias.  Patient notes left lower extremity paresthesias have been ongoing for years.  Patient states he currently has to sleep in the fetal position with multiple pillows between his legs to be comfortable.  Notes no improvement of symptoms with Tylenol/ibuprofen, however has mild improvement of symptoms with oxycodone.  Denies bowel/bladder dysfunction, saddle anesthesia, recent fall/trauma.  Patient recently notes his gait has changed due to his left leg pain.  Exacerbation of left leg pain when leaning to the left.  Patient has not completed  any physical therapy directed at his back or left leg.    Patient expresses extensive concern regarding fear of needles and fear of back surgery.  Patient has a friend who underwent back surgery with worsening of symptoms following.  Patient continues to express concern about wanting to try PT, MDP, oral pain medications prior to discussing surgical intervention with Dr. Schwartz.    6/27/24:  Returns for follow up.  Continued severe left leg pain radiating to the shin as described above.  MR Lumbar, personally reviewed, with L2-S1 spondylotic change, left L5-S1 disc degeneration and facet hypertrophy contributing to severe left sided foraminal stenosis.      Past Medical History:   Diagnosis Date     Bell's palsy        Past Medical History reviewed with patient during visit.    No past surgical history on file.  Past Surgical History reviewed with patient during visit.    Current Outpatient Medications   Medication Sig Dispense Refill     ibuprofen (ADVIL/MOTRIN) 600 MG tablet Take 1 tablet (600 mg) by mouth every 6 hours as needed for moderate pain 60 tablet 0     oxyCODONE (ROXICODONE) 5 MG tablet Take 1 tablet (5 mg) by mouth every 6 hours as needed for pain 25 tablet 0     oxyCODONE (ROXICODONE) 5 MG tablet Take 1 tablet (5 mg) by mouth every 6 hours as needed for moderate pain or moderate to severe pain 30 tablet 0     No current facility-administered medications for this visit.       No Known Allergies    Social History     Socioeconomic History     Marital status:    Tobacco Use     Smoking status: Never     Smokeless tobacco: Never   Substance and Sexual Activity     Alcohol use: Yes     Drug use: Yes     Types: Marijuana     Sexual activity: Not Currently     Social Determinants of Health      Received from [facility], [facility] [employer] [facility]    Social Connections       No family history on file.      ROS: 10 point ROS neg other than the symptoms  noted above in the HPI.    Vital Signs:   /72   Pulse 105   Temp 97.7  F (36.5  C) (Temporal)   Resp 18   Wt 121.6 kg (268 lb)   SpO2 95%   BMI 37.38 kg/m        Examination:  Constitutional:  Alert, well nourished, Anxious.   Memory: recent and remote memory   HEENT: Normocephalic, atraumatic.   Pulm:  Without shortness of breath   CV:  No pitting edema of BLE.      Neurological:  Awake  Alert  Oriented x 3  Speech clear    Motor exam:   Hip Flexion:                 Right: 5/5  Left:  5/5 (some difficulty with hip flexion which patient correlates to low back/left leg pain)  Knee Flexion:              Right:  5/5  Left:  5/5  Knee Extension:          Right:  5/5  Left:  5/5  Plantar Flexion:           Right:  5/5  Left:  5/5  Dorsal Flexion:            Right:  5/5  Left:  5/5  EHL:                            Right:  5/5  Left:  5/5     Sensation normal to bilateral lower extremities  Antalgic gait  Able to heel/toe walk without loss of balance      Imaging:   Imaging Interpretation provided by radiologist.   MRI LUMBAR SPINE ROUTINE WITHOUT CONTRAST   COMPARISON:   March 15, 2021     FINDINGS:   No listhesis.  No suspicious marrow signal.  No acute fracture or focal   destructive osseous lesion. The distal spinal cord and conus medullaris are   normal MR signal intensity and caliber.  The conus  terminates at theL1   vertebral body level.  Cauda equina is unremarkable.  Cyst is seen within the   right kidney.  Prevertebral and paraspinal soft tissues are otherwise   unremarkable.     Levels:   T12-L1: Shallow broad-based disc bulge without significant neural foraminal or   spinal canal stenosis.     L1-L2: Minimal disc desiccation.  No significant disc bulge.  No significant   spinal canal or foraminal stenosis.     L2-L3: Broad-based disc bulge resulting in moderate bilateral neural foraminal   narrowing.  Uncovertebral hypertrophy and mild facet arthropathy contribute to   minimal spinal canal  stenosis.     L3-L4: Broad-based disc bulge resulting in moderate bilateral neural foraminal   narrowing.  Uncovertebral hypertrophy and facet arthropathy contribute to   minimal spinal canal stenosis.  Small central annular fissure.     L4-L5: Broad-based disc bulge resulting in moderate left and severe right   neural foraminal narrowing.  There is an associated disc extrusion on the right   extending from the right paracentral region extending inferolaterally measuring   approximately 1.4 cm.  The extrusion impinges on the transiting and exiting   right L5 nerve root.  Uncovertebral hypertrophy and facet arthropathy   contribute to mild spinal canal stenosis.     L5-S1: Broad-based disc bulge resulting in severe left and mild-to-moderate   right neural foraminal narrowing.  The bulge abuts the transiting left S1 nerve   root.  Bilateral facet arthropathy.  No significant central canal stenosis.     IMPRESSION:   1. Multilevel degenerative change as described above.  Findings are most   pronounced at L4-L5 with broad-based disc bulge resulting in moderate left and   severe right neural foraminal narrowing.  Additionally, a disc extrusion at   this level extends inferiorly and impinges on the transiting and exiting right   L5 nerve root.   2. Severe left neural foraminal narrowing at L5-S1.       XR lumbar spine 2/3 views 5/13/2024  Imaging Interpretation provided by radiologist.  Final radiologist report pending.  See patient's chart for further details.      Assessment/Plan:     Patient very concerned about injection and surgical options, prefers approach of PT and oral pain management  Will refer for PT/lumbar traction  Pain clinic referral  If he decides to proceed with left L5-S1 transforaminal JA, he will call us and we will order      Again, thank you for allowing me to participate in the care of your patient.        Sincerely,        Leland Schwartz MD

## 2024-07-10 ENCOUNTER — THERAPY VISIT (OUTPATIENT)
Dept: PHYSICAL THERAPY | Facility: CLINIC | Age: 59
End: 2024-07-10
Payer: COMMERCIAL

## 2024-07-10 DIAGNOSIS — M54.42 ACUTE LEFT-SIDED LOW BACK PAIN WITH LEFT-SIDED SCIATICA: Primary | ICD-10-CM

## 2024-07-10 PROCEDURE — 97012 MECHANICAL TRACTION THERAPY: CPT | Mod: GP | Performed by: PHYSICAL THERAPIST

## 2024-07-10 PROCEDURE — 97110 THERAPEUTIC EXERCISES: CPT | Mod: GP | Performed by: PHYSICAL THERAPIST

## 2024-08-29 NOTE — PROGRESS NOTES
07/10/24 0500   Appointment Info   Signing clinician's name / credentials John Sood, PT, DPT, OCS   Total/Authorized Visits MA needs recert on   Visits Used 4/10   Medical Diagnosis Acute left-sided low back pain with left-sided sciatica   PT Tx Diagnosis LBP radiculopathy LLE, weakness in the LLE   Precautions/Limitations fall risk with gait issues and decrease LE strength   Quick Adds Certification   Progress Note/Certification   Start of Care Date 05/16/24   Onset of illness/injury or Date of Surgery 04/30/24   Therapy Frequency 1x per wk   Predicted Duration 10 wks   Certification date from 05/16/24   Certification date to 07/25/24   Progress Note Due Date 07/25/24   Progress Note Completed Date 05/16/24   GOALS   PT Goals 2   PT Goal 1   Goal Identifier 1   Goal Description Patient has 50% decrease in LBP and 50% increase in function as shown by a BRENDAN score of 28%   Target Date 07/25/24   PT Goal 2   Goal Identifier 2   Goal Description Patient is able to walk without pain in the LLE and no weakness, thus no limping and having to turn the LLE outward to walk   Target Date 07/25/24   Subjective Report   Subjective Report Pt reports pain is not too bad today but fluctuates frequently currently 4/10 at L SI joint.  MRI revealed neural foraminal stenosis at L L5-S1 nerve root.   Objective Measure 1   Objective Measure BRENDAN (56%)   Objective Measure 2   Objective Measure Pain   Details see above   PT Modalities   PT Modalities Mechanical Traction   Electrical Stimulation   Treatment Detail IFC to the LB  for 20 min intensity of 20   Patient Response/Progress tolerated ok   Mechanical Traction   Mechanical Traction, Minutes (77061) 10   Patient Response/Progress Produced L calf symptoms.   Treatment Detail Lumbar Adams traction unit x 10 minute static pull at 80#.   Therapeutic Procedure/Exercise   Therapeutic Procedures: strength, endurance, ROM, flexibility minutes (69956) 30   Ther Proc 1 - Details  Reviewed prone exercises with patient reporting pinching pain, no change to symptoms.  Discussed MRI findings and tendency of fetal position while sleeping as indicators that he may respond well to flexion.  Performed DKTC in supine with PT assist with decreased and centralized symptoms to low back. Instructed in home performance.  Performed hook lying with symptoms abolished.  Performed manual traction with legs in 90-90 degrees and discussed performing this at home with assist from his wife.   Patient Response/Progress Able to centralize and abolish symptoms.   Skilled Intervention See above   Education   Learner/Method Patient;Listening;Reading;Demonstration;Pictures/Video   Plan   Home program DKTC in supine, hooklying and traction from spouse as needed.   Plan for next session F/u flexion bias, consider lateral component to open the L side.  Consider L SI joint.   Total Session Time   Timed Code Treatment Minutes 30   Total Treatment Time (sum of timed and untimed services) 40         DISCHARGE  Reason for Discharge: Patient chooses to discontinue therapy.    Equipment Issued: none.    Discharge Plan: Patient to continue home program.    Referring Provider:  Hannah Turner

## 2024-09-19 ENCOUNTER — NURSE TRIAGE (OUTPATIENT)
Dept: FAMILY MEDICINE | Facility: CLINIC | Age: 59
End: 2024-09-19
Payer: COMMERCIAL

## 2024-09-19 NOTE — TELEPHONE ENCOUNTER
S-(situation): Wrist injury    B-(background): Patient states he fell about 10 days ago and landed on his hand. Patient is still in pain, rating it a 7/10. Patient denies swelling or discoloration. However, patient states he is unable to use his thumb.     A-(assessment): Per RN protocol, patient should be seen in office today.     R-(recommendations): Patient is at the cabin and not able to be seen today. RN advised for patient to be seen at nearby . Patient agreeable. No further questions or concerns at this time.      Additional Information   Negative: Major bleeding (actively dripping or spurting) that can't be stopped   Negative: Amputation or bone sticking through the skin   Negative: Serious injury with multiple fractures (broken bones)   Negative: Sounds like a life-threatening emergency to the triager   Negative: Finger injury is main concern   Negative: Caused by an animal bite   Negative: Cast problems or questions   Negative: Wound looks infected   Negative: Hand pain from overuse (e.g., physical work, typing)   Negative: Hand pain not from an injury   Negative: Bullet, stabbed by knife or other serious penetrating wound   Negative: High pressure injection injury (e.g., from paint gun, usually work-related)   Negative: Looks infected (e.g., spreading redness, red streak, pus)   Negative: Looks like a broken bone or dislocated joint (crooked or deformed)   Negative: Skin is split open or gaping (length > 1/2 inch or 12 mm)   Negative: Bleeding won't stop after 10 minutes of direct pressure (using correct technique)   Negative: Dirt in the wound and not removed after 15 minutes of scrubbing   Negative: Numbness (loss of sensation) of finger(s)   Negative: Sounds like a serious injury to the triager   Negative: SEVERE pain (e.g., excruciating)    Protocols used: Hand and Wrist Injury-A-[place]

## 2024-10-07 ENCOUNTER — OFFICE VISIT (OUTPATIENT)
Dept: FAMILY MEDICINE | Facility: CLINIC | Age: 59
End: 2024-10-07
Payer: COMMERCIAL

## 2024-10-07 ENCOUNTER — ANCILLARY PROCEDURE (OUTPATIENT)
Dept: GENERAL RADIOLOGY | Facility: CLINIC | Age: 59
End: 2024-10-07
Payer: COMMERCIAL

## 2024-10-07 VITALS
SYSTOLIC BLOOD PRESSURE: 125 MMHG | DIASTOLIC BLOOD PRESSURE: 68 MMHG | OXYGEN SATURATION: 97 % | TEMPERATURE: 97.5 F | WEIGHT: 266 LBS | BODY MASS INDEX: 39.4 KG/M2 | HEIGHT: 69 IN | HEART RATE: 90 BPM
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
## 2024-10-07 DIAGNOSIS — M25.531 RIGHT WRIST PAIN: Primary | ICD-10-CM

## 2024-10-07 DIAGNOSIS — M25.531 RIGHT WRIST PAIN: ICD-10-CM

## 2024-10-07 PROCEDURE — 99214 OFFICE O/P EST MOD 30 MIN: CPT

## 2024-10-07 PROCEDURE — 73130 X-RAY EXAM OF HAND: CPT | Mod: TC | Performed by: RADIOLOGY

## 2024-10-07 RX ORDER — OXYCODONE HYDROCHLORIDE 5 MG/1
5 TABLET ORAL EVERY 6 HOURS PRN
Qty: 12 TABLET | Refills: 0 | Status: SHIPPED | OUTPATIENT
Start: 2024-10-07 | End: 2024-10-17

## 2024-10-07 ASSESSMENT — PAIN SCALES - GENERAL: PAINLEVEL: SEVERE PAIN (7)

## 2024-10-07 NOTE — PATIENT INSTRUCTIONS
I do have concerns of fracture on the x-ray.  Images will be read by a radiologist and I will reach out with formal report. Continue with immobilization with thumb spica splint while active and when resting. Continue with frequent ice application (4+ times per day). Take as needed acetaminophen (Tylenol) and ibuprofen (Advil) for pain relief. Do not exceed 3,000 mg per day of acetaminophen (Tylenol) or 3,200 mg of ibuprofen (Advil).     I have sent a prescription for oxycodone. Please take sparingly for severe pain. Do not drive or operate machinery while taking.

## 2024-10-07 NOTE — PROGRESS NOTES
Assessment & Plan     Right wrist pain  Fall, initial encounter  Patient is a 58 year-old male presenting with acute wrist pain after falling 10 days ago. XR completed with concerns for fracture, however, images are pending formal read by radiologist. Placed in thumb spica for immobilization today. Orthopedic referral placed for further evaluation and management, may need to change referral priority based on XR results. Discussed conservative management including as needed acetaminophen/ibuprofen, ice, and immobilization. Prescribed 10 tablets of oxycodone to be taken for severe pain. Discussed proper use of medication(s) and potential side effects. Follow-up if symptoms fail to improve despite above interventions. Patient understands and is agreeable to plan as discussed in clinic.  - XR Hand Right G/E 3 Views; Future  - Orthopedic  Referral; Future  - Wrist/Arm/Hand Bracing Supplies Order Wrist Brace; Right; with thumb spica  - oxyCODONE (ROXICODONE) 5 MG tablet; Take 1 tablet (5 mg) by mouth every 6 hours as needed for pain.      Subjective   Oneil is a 58 year old, presenting for the following health issues:  Musculoskeletal Problem      10/7/2024     2:53 PM   Additional Questions   Roomed by AG   Accompanied by self     History of Present Illness       Reason for visit:  Right wrist  Symptom onset:  1-2 weeks ago   He is taking medications regularly.     Concern - R wrist pain  Onset: ~10 days ago  Description: fell and caught himself on wrist twice (separate incidents, same day), pt heard a pop the second time, pain is mostly around R thumb, can feel shooting/stabbing pain up wrist into R forearm when moving thumb   Intensity: severe  Progression of Symptoms:  worsening  Accompanying Signs & Symptoms: swelling  Previous history of similar problem: broke wrist in similar spot when 21yo  Precipitating factors:        Worsened by: movement, particularly lifting thumb  Alleviating factors:         "Improved by: gentle compression/pressure  Therapies tried and outcome: splint, was uncomfortable and made the pt's wrist throb so he took it off, ibuprofen/tylenol/advil very little relief- 800mg ibuprofen takes the edge off, did take leftover oxycodone (night only) which helped but doesn't want to take during daytime    Presents with concerns of ongoing right wrist pain for the past 10 days. States that he fell prior to pain onset twice in the same day. Has been treating with as needed acetaminophen and ibuprofen without significant relief. Has also taken previously prescribed oxycodone with some relief of symptoms. Experiencing decreased ROM in wrist along with slight swelling noticed yesterday.     Denies numbness, tingling, diminished sensation, or temperature discrepancy in right distal extremity.         Objective    /68   Pulse 90   Temp 97.5  F (36.4  C) (Temporal)   Ht 1.75 m (5' 8.9\")   Wt 120.7 kg (266 lb)   SpO2 97%   BMI 39.40 kg/m    Body mass index is 39.4 kg/m .  Physical Exam  Vitals reviewed.   Constitutional:       General: He is not in acute distress.     Appearance: Normal appearance. He is not ill-appearing.   Cardiovascular:      Rate and Rhythm: Normal rate and regular rhythm.      Pulses:           Radial pulses are 2+ on the right side and 2+ on the left side.      Heart sounds: Normal heart sounds, S1 normal and S2 normal. No murmur heard.  Pulmonary:      Effort: Pulmonary effort is normal. No respiratory distress.      Breath sounds: Normal breath sounds. No wheezing.   Musculoskeletal:      Right wrist: Tenderness (distal radial head) present. No snuff box tenderness or crepitus. Decreased range of motion (limited due to pain).      Left wrist: Normal.      Right hand: Decreased range of motion (limited due to pain).      Left hand: Normal.   Skin:     General: Skin is warm and dry.      Capillary Refill: Capillary refill takes less than 2 seconds.      Findings: No lesion " or rash.   Neurological:      Mental Status: He is alert.      Sensory: Sensation is intact. No sensory deficit.      Motor: Motor function is intact. No weakness.        XR pending formal read by radiologist.         Signed Electronically by: BRENDEN Camarena CNP

## 2024-10-08 ENCOUNTER — PATIENT OUTREACH (OUTPATIENT)
Dept: CARE COORDINATION | Facility: CLINIC | Age: 59
End: 2024-10-08
Payer: COMMERCIAL

## 2024-10-08 ENCOUNTER — TELEPHONE (OUTPATIENT)
Dept: FAMILY MEDICINE | Facility: OTHER | Age: 59
End: 2024-10-08
Payer: COMMERCIAL

## 2024-10-08 NOTE — TELEPHONE ENCOUNTER
Called and relayed report from right wrist XR. Plan to continue with immobilizations, rest, ice, and as needed acetaminophen/ibuprofen for pain relief. Recommended sparing using or prescribed oxycodone. Plan for follow-up with orthopedics. Referral placed at time of appointment.

## 2024-10-10 ENCOUNTER — PATIENT OUTREACH (OUTPATIENT)
Dept: CARE COORDINATION | Facility: CLINIC | Age: 59
End: 2024-10-10
Payer: COMMERCIAL

## 2024-10-16 NOTE — PROGRESS NOTES
Thad Guerra  :  1965  DOS: 10/17/2024  MRN: [id number]  PCP: No Ref-Primary, Physician    Sports Medicine Clinic Visit    HPI  Thad Guerra is a 58 year old male who is seen in consultation at the request of  Bridget Sal C.N.P. presenting with right wrist pain.    - Mechanism of Injury:    -  2024 fall  x2  - Pertinent history and prior evaluations:    - 10/7/2024 with BRENDEN Camarena CNP: Thumb spica for immobilization. Recommended RICE and prescribed 10 tablets of oxycodone for breakthrough pain.    - 10/7/2024 right wrist xray shows no acute displaced right hand fracture identified. Mild scattered arthritic changes right hand involving the IP and second and third MCP joints. There are a few circular foci of mineralization in the soft tissues adjacent to the volar aspect of the distal radius which are nonspecific, favor degenerative or posttraumatic. Nonunited fracture fragment adjacent to the ulnar styloid.    - Pain Character:    - Location:  Right thumb pain with radiation into radial wrist and forearm.   - Character:  throbbing and aching  - Duration:  3 weeks   - Course:  lingering  - Endorses:    - swelling, pain as above, antalgic weakness with all thumb movements  - Denies:    - clicking/popping, grinding, mechanical locking symptoms, instability, numbness, tingling, radicular shooting pain  - Alleviating factors:    -  Nothing  - Aggravating factors:    -  gripping, use of wrist  - Other treatments tried:    - Tylenol, ibuprofen, oxycodone for sleep, wrist brace worn incorrectly (using a left wrist brace on right wrist)    - Patient Goals:    - discuss treatment options  - Social History:   -  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 Not working currently      Review of Systems  Musculoskeletal: as above  Remainder of review of systems is negative including constitutional, CV, pulmonary, GI, Skin and Neurologic except as noted in HPI or medical history.    Past Medical History:    Diagnosis Date    Bell's palsy     Umbilical hernia without obstruction and without gangrene 10/14/2016     No past surgical history on file.  No family history on file.      Objective  Wt 120.7 kg (266 lb)   BMI 39.40 kg/m      General: healthy, alert and in no acute distress.    HEENT: no scleral icterus or conjunctival erythema.   Skin: no suspicious lesions or rash. No jaundice.   CV: regular rhythm by palpation, 2+ distal pulses.  Resp: normal respiratory effort without conversational dyspnea.   Psych: normal mood and affect.    Gait: nonantalgic, appropriate coordination and balance.     Neuro:        - Sensation to light touch:    - Intact throughout the BUE including all peripheral nerve distributions.     MSK - Wrist/Hand:        - Inspection:    -Swelling present along the de Quervain's tendons at the radius retinaculum without surrounding erythema, warmth, ecchymosis, lesion, or atrophy noted.        - ROM:    - Limited in thumb extension and abduction due to pain       - Palpation:    - TTP at the de Quervain's tendons.   - NTTP elsewhere.        - Strength:  (*antalgic)   - Wrist Extension   5   - Wrist Flexion    5   - FDI     5   - ADM     5   - FPL     5*   - APB     5*   - EIP     5   - EDC     5   - APL/EPB    5-*            - Special tests:        - CMC grind:  Neg    - Finkelstein's:  ++Pos   - TFCC compression:  Neg    - Godwin's:  Neg       Radiology  I independently reviewed the available relevant imaging in the chart with my interpretations as above in HPI.       Procedure  Hand / Upper Extremity Injection/Arthrocentesis: R extensor compartment 1    Date/Time: 10/17/2024 1:56 PM    Performed by: Kade Leonard DO  Authorized by: Kade Leonard DO    Indications:  Pain  Needle Size:  27 G  Guidance: ultrasound    Approach:  Radial  Condition: de Quervain's      Site:  R extensor compartment 1  Medications:  0.5 mL lidocaine 1 %; 20 mg triamcinolone 40 MG/ML  Outcome:  Tolerated well, no  immediate complications  Procedure discussed: discussed risks, benefits, and alternatives    Consent Given by:  Patient  Prep: patient was prepped and draped in usual sterile fashion     Ultrasound images of procedure were permanently stored.     Procedure:   De Quervain's Tendon Sheath Injection  Consent given, and signed on chart. The area was prepped in typical sterile fashion with chlorhexidine solution. First dorsal compartment thumb extensor tendons (APL/EPB) were identified under ultrasound on long and short axis.  A 1.5 inch 27 gauge needle was placed into the first dorsal compartment tendon sheath between the APL and EPB tendons, taking care not to inject the tendons themselves.  A mixture of 0.5ml 1% lidocaine and 0.5 ml kenalog (40mg/ml) was injected without difficulty. Ultrasound documentation of needle placement and injection was achieved.  After removal of the needle, the area was cleaned, hemostasis achieved, and bandage applied.  Patient tolerated the procedure well, no complications.      Assessment  1. De Quervain's tenosynovitis, right    2. Right wrist pain    3. Fall, initial encounter        Plan  Thad Guerra is a pleasant 58 year old male that presents with right sided radial wrist and thumb pain after a fall on an outstretched hand approximately 3 weeks ago.  He has noticed swelling over the radial wrist and severe pain with any thumb extension and abduction.  He was previously seen and there was a concern for some chronic ossicles near the radius and an old nonunited ulnar styloid fracture, which could have been exacerbated by the fall.  He has been wearing an ill fitting left wrist brace on his right wrist for stabilization and utilizing oxycodone for pain relief at night.  On exam, he has severe TTP over the de Quervain's tendons and a severely positive Finkelstein's. History and physical exam appear most consistent with severe de Quervain's tenosynovitis, likely posttraumatic.    We  discussed the nature of the condition and available treatment options, and mutually agreed upon the following plan:    - Imaging:          - Reviewed and independently interpreted the relevant imaging in the chart, including any imaging ordered for today's clinic.  - Reviewed results and images with patient.   - Medications:          - Discussed pharmacologic options for pain relief.   - May use NSAIDs (Ibuprofen, Naproxen) or Acetaminophen (Tylenol) as needed for pain control.   - Do not take these if previously advised to avoid them for other medical conditions.  - May also use topical medications such as lidocaine, IcyHot, BioFreeze, or Voltaren gel as needed for pain control.    - Voltaren gel is an anti-inflammatory cream that may be used up to 4 times per day over the painful area.   -By patient request, added a short prescription of oxycodone to get him through the next few nights of severe pain.  Discussed alternatives, declined, and that we would not be refilling this medication.  - Injections:          - Discussed possible injection options and alternatives.    - Injection options include: Corticosteroid injection of the de Quervain's tendon sheath.  - Performed a corticosteroid injection of the right de Quervain's tendon sheath today in clinic. Patient tolerated the procedure well without complications.     - Post-procedure instructions:    - Keep the injection site clean and dry.   - Do not submerge the injection site for 24 hours (no baths, pools). Showers are ok.   - Rest the area for 24-48 hours before resuming normal activities. Avoid overexerting the area for the first few weeks.   - It may take 2-3 days to start noticing the effects of the injection and up to 3-4 weeks to feel significant benefits.   - Therapy:          - Discussed the benefits of therapy vs home exercise program for optimization of range of motion, flexibility, strength, stability and function.   - Preference is for a home  exercise program.   - Home Exercise Program given today in clinic and recommendation given to perform HEP daily and after exacerbations.  - Modalities:          - May use ice, heat, massage or other modalities as needed.   - Bracing:          - Discussed bracing options and recommend using a thumb spica brace during exacerbating activities and asked rest if needed.  - Activity:          - Encouraged to remain active and participate in regular activities as symptoms allow.   Avoid or modify exacerbating activities as needed.  - Follow up:          - As needed for re-evaluation and update to treatment plan.  - May follow up sooner for new/worsening symptoms.  - May contact clinic by phone or MyChart for questions or concerns.       Kade Leonard DO, MIGUEL  [facility] - Sports Medicine  [facility] - Department of Orthopedic Surgery       Disclaimer:  This note was prepared and written using Dragon Medical dictation software. As a result, there may be errors in the script that have gone undetected. Please consider this when interpreting the information in this note.

## 2024-10-17 ENCOUNTER — OFFICE VISIT (OUTPATIENT)
Dept: ORTHOPEDICS | Facility: CLINIC | Age: 59
End: 2024-10-17
Payer: COMMERCIAL

## 2024-10-17 VITALS — BODY MASS INDEX: 39.4 KG/M2 | WEIGHT: 266 LBS

## 2024-10-17 DIAGNOSIS — M65.4 DE QUERVAIN'S TENOSYNOVITIS, RIGHT: Primary | ICD-10-CM

## 2024-10-17 DIAGNOSIS — M25.531 RIGHT WRIST PAIN: ICD-10-CM
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
## 2024-10-17 PROCEDURE — 20550 NJX 1 TENDON SHEATH/LIGAMENT: CPT | Mod: RT | Performed by: STUDENT IN AN ORGANIZED HEALTH CARE EDUCATION/TRAINING PROGRAM

## 2024-10-17 PROCEDURE — 99214 OFFICE O/P EST MOD 30 MIN: CPT | Mod: 25 | Performed by: STUDENT IN AN ORGANIZED HEALTH CARE EDUCATION/TRAINING PROGRAM

## 2024-10-17 RX ORDER — OXYCODONE HYDROCHLORIDE 5 MG/1
5 TABLET ORAL 3 TIMES DAILY PRN
Qty: 9 TABLET | Refills: 0 | Status: SHIPPED | OUTPATIENT
Start: 2024-10-17 | End: 2024-10-20

## 2024-10-17 RX ORDER — TRIAMCINOLONE ACETONIDE 40 MG/ML
20 INJECTION, SUSPENSION INTRA-ARTICULAR; INTRAMUSCULAR
Status: COMPLETED | OUTPATIENT
Start: 2024-10-17 | End: 2024-10-17

## 2024-10-17 RX ORDER — LIDOCAINE HYDROCHLORIDE 10 MG/ML
0.5 INJECTION, SOLUTION INFILTRATION; PERINEURAL
Status: COMPLETED | OUTPATIENT
Start: 2024-10-17 | End: 2024-10-17

## 2024-10-17 RX ADMIN — LIDOCAINE HYDROCHLORIDE 0.5 ML: 10 INJECTION, SOLUTION INFILTRATION; PERINEURAL at 13:56

## 2024-10-17 RX ADMIN — TRIAMCINOLONE ACETONIDE 20 MG: 40 INJECTION, SUSPENSION INTRA-ARTICULAR; INTRAMUSCULAR at 13:56

## 2024-10-17 ASSESSMENT — PAIN SCALES - GENERAL: PAINLEVEL: SEVERE PAIN (6)

## 2024-10-17 NOTE — LETTER
10/17/2024      Thad Guerra  [address]  [address]  [place] [address]      Dear Colleague,    Thank you for referring your patient, Thad Guerra, to the [facility] [place]. Please see a copy of my visit note below.    Thad Guerra  :  1965  DOS: 10/17/2024  MRN: [id number]  PCP: No Ref-Primary, Physician    Sports Medicine Clinic Visit    HPI  Thad Guerra is a 58 year old male who is seen in consultation at the request of  Bridget Sal C.N.P. presenting with right wrist pain.    - Mechanism of Injury:    -  2024 fall  x2  - Pertinent history and prior evaluations:    - 10/7/2024 with BRENDEN Camarena CNP: Thumb spica for immobilization. Recommended RICE and prescribed 10 tablets of oxycodone for breakthrough pain.    - 10/7/2024 right wrist xray shows no acute displaced right hand fracture identified. Mild scattered arthritic changes right hand involving the IP and second and third MCP joints. There are a few circular foci of mineralization in the soft tissues adjacent to the volar aspect of the distal radius which are nonspecific, favor degenerative or posttraumatic. Nonunited fracture fragment adjacent to the ulnar styloid.    - Pain Character:    - Location:  Right thumb pain with radiation into radial wrist and forearm.   - Character:  throbbing and aching  - Duration:  3 weeks   - Course:  lingering  - Endorses:    - swelling, pain as above, antalgic weakness with all thumb movements  - Denies:    - clicking/popping, grinding, mechanical locking symptoms, instability, numbness, tingling, radicular shooting pain  - Alleviating factors:    -  Nothing  - Aggravating factors:    -  gripping, use of wrist  - Other treatments tried:    - Tylenol, ibuprofen, oxycodone for sleep, wrist brace worn incorrectly (using a left wrist brace on right wrist)    - Patient Goals:    - discuss treatment options  - Social History:   -  helps mom and dad  out on occasion. Not working currently      Review of Systems  Musculoskeletal: as above  Remainder of review of systems is negative including constitutional, CV, pulmonary, GI, Skin and Neurologic except as noted in HPI or medical history.    Past Medical History:   Diagnosis Date     Bell's palsy      Umbilical hernia without obstruction and without gangrene 10/14/2016     No past surgical history on file.  No family history on file.      Objective  Wt 120.7 kg (266 lb)   BMI 39.40 kg/m      General: healthy, alert and in no acute distress.    HEENT: no scleral icterus or conjunctival erythema.   Skin: no suspicious lesions or rash. No jaundice.   CV: regular rhythm by palpation, 2+ distal pulses.  Resp: normal respiratory effort without conversational dyspnea.   Psych: normal mood and affect.    Gait: nonantalgic, appropriate coordination and balance.     Neuro:        - Sensation to light touch:    - Intact throughout the BUE including all peripheral nerve distributions.     MSK - Wrist/Hand:        - Inspection:    -Swelling present along the de Quervain's tendons at the radius retinaculum without surrounding erythema, warmth, ecchymosis, lesion, or atrophy noted.        - ROM:    - Limited in thumb extension and abduction due to pain       - Palpation:    - TTP at the de Quervain's tendons.   - NTTP elsewhere.        - Strength:  (*antalgic)   - Wrist Extension   5   - Wrist Flexion    5   - FDI     5   - ADM     5   - FPL     5*   - APB     5*   - EIP     5   - EDC     5   - APL/EPB    5-*            - Special tests:        - CMC grind:  Neg    - Finkelstein's:  ++Pos   - TFCC compression:  Neg    - Godwin's:  Neg       Radiology  I independently reviewed the available relevant imaging in the chart with my interpretations as above in HPI.       Procedure  Hand / Upper Extremity Injection/Arthrocentesis: R extensor compartment 1    Date/Time: 10/17/2024 1:56 PM    Performed by: Kade Leonard DO Authorized  by: Kade Leonard DO    Indications:  Pain  Needle Size:  27 G  Guidance: ultrasound    Approach:  Radial  Condition: de Quervain's      Site:  R extensor compartment 1  Medications:  0.5 mL lidocaine 1 %; 20 mg triamcinolone 40 MG/ML  Outcome:  Tolerated well, no immediate complications  Procedure discussed: discussed risks, benefits, and alternatives    Consent Given by:  Patient  Prep: patient was prepped and draped in usual sterile fashion     Ultrasound images of procedure were permanently stored.     Procedure:   De Quervain's Tendon Sheath Injection  Consent given, and signed on chart. The area was prepped in typical sterile fashion with chlorhexidine solution. First dorsal compartment thumb extensor tendons (APL/EPB) were identified under ultrasound on long and short axis.  A 1.5 inch 27 gauge needle was placed into the first dorsal compartment tendon sheath between the APL and EPB tendons, taking care not to inject the tendons themselves.  A mixture of 0.5ml 1% lidocaine and 0.5 ml kenalog (40mg/ml) was injected without difficulty. Ultrasound documentation of needle placement and injection was achieved.  After removal of the needle, the area was cleaned, hemostasis achieved, and bandage applied.  Patient tolerated the procedure well, no complications.      Assessment  1. De Quervain's tenosynovitis, right    2. Right wrist pain    3. Fall, initial encounter        Plan  Thad Guerra is a pleasant 58 year old male that presents with right sided radial wrist and thumb pain after a fall on an outstretched hand approximately 3 weeks ago.  He has noticed swelling over the radial wrist and severe pain with any thumb extension and abduction.  He was previously seen and there was a concern for some chronic ossicles near the radius and an old nonunited ulnar styloid fracture, which could have been exacerbated by the fall.  He has been wearing an ill fitting left wrist brace on his right wrist for  stabilization and utilizing oxycodone for pain relief at night.  On exam, he has severe TTP over the de Quervain's tendons and a severely positive Finkelstein's. History and physical exam appear most consistent with severe de Quervain's tenosynovitis, likely posttraumatic.    We discussed the nature of the condition and available treatment options, and mutually agreed upon the following plan:    - Imaging:          - Reviewed and independently interpreted the relevant imaging in the chart, including any imaging ordered for today's clinic.  - Reviewed results and images with patient.   - Medications:          - Discussed pharmacologic options for pain relief.   - May use NSAIDs (Ibuprofen, Naproxen) or Acetaminophen (Tylenol) as needed for pain control.   - Do not take these if previously advised to avoid them for other medical conditions.  - May also use topical medications such as lidocaine, IcyHot, BioFreeze, or Voltaren gel as needed for pain control.    - Voltaren gel is an anti-inflammatory cream that may be used up to 4 times per day over the painful area.   -By patient request, added a short prescription of oxycodone to get him through the next few nights of severe pain.  Discussed alternatives, declined, and that we would not be refilling this medication.  - Injections:          - Discussed possible injection options and alternatives.    - Injection options include: Corticosteroid injection of the de Quervain's tendon sheath.  - Performed a corticosteroid injection of the right de Quervain's tendon sheath today in clinic. Patient tolerated the procedure well without complications.     - Post-procedure instructions:    - Keep the injection site clean and dry.   - Do not submerge the injection site for 24 hours (no baths, pools). Showers are ok.   - Rest the area for 24-48 hours before resuming normal activities. Avoid overexerting the area for the first few weeks.   - It may take 2-3 days to start noticing  the effects of the injection and up to 3-4 weeks to feel significant benefits.   - Therapy:          - Discussed the benefits of therapy vs home exercise program for optimization of range of motion, flexibility, strength, stability and function.   - Preference is for a home exercise program.   - Home Exercise Program given today in clinic and recommendation given to perform HEP daily and after exacerbations.  - Modalities:          - May use ice, heat, massage or other modalities as needed.   - Bracing:          - Discussed bracing options and recommend using a thumb spica brace during exacerbating activities and asked rest if needed.  - Activity:          - Encouraged to remain active and participate in regular activities as symptoms allow.   Avoid or modify exacerbating activities as needed.  - Follow up:          - As needed for re-evaluation and update to treatment plan.  - May follow up sooner for new/worsening symptoms.  - May contact clinic by phone or MyChart for questions or concerns.       Kade Leonard DO CAQSM  [facility] Sports Medicine  [facility] - Department of Orthopedic Surgery       Disclaimer:  This note was prepared and written using Dragon Medical dictation software. As a result, there may be errors in the script that have gone undetected. Please consider this when interpreting the information in this note.        Again, thank you for allowing me to participate in the care of your patient.        Sincerely,        Kade Leonard DO

## 2024-11-11 ENCOUNTER — TELEPHONE (OUTPATIENT)
Dept: FAMILY MEDICINE | Facility: CLINIC | Age: 59
End: 2024-11-11
Payer: COMMERCIAL

## 2024-11-11 NOTE — TELEPHONE ENCOUNTER
Patient Quality Outreach    Patient is due for the following:   Physical Preventive Adult Physical      Topic Date Due    Hepatitis B Vaccine (1 of 3 - 19+ 3-dose series) Never done    Diptheria Tetanus Pertussis (DTAP/TDAP/TD) Vaccine (1 - Tdap) Never done    Zoster (Shingles) Vaccine (1 of 2) Never done    Flu Vaccine (1) Never done    COVID-19 Vaccine (1 - 2024-25 season) Never done       Action(s) Taken:   Schedule a Adult Preventative    Type of outreach:    Call to schedule annual physical. Update PCP information if patient is reached.  Send letter in 1 week if not reached/return call.    Questions for provider review:    None           Sarah Lua, [facility]  Chart routed to Care Team.

## 2024-11-11 NOTE — LETTER
[facility]  [address]  [place] [address]  Phone: [phone]  Fax: [phone]  November 19, 2024      Thad Guerra  [address] [place] [address]  [address]  [place] [address]      Dear Thad,    We care about your health and have reviewed your health plan including your medical conditions, medications, and lab results.  Based on this review, it is recommended that you follow up regarding the following health topic(s):  -Wellness (Physical) Visit     We recommend you take the following action(s):  -Schedule a Wellness (physical) Visit     Please call us at the [facility] [phone] (or use [employer]) to address the above recommendations.     Thank you for trusting [facility] and we appreciate the opportunity to serve you.  We look forward to supporting your healthcare needs in the future.    Healthy Regards,    Your Health Care Team  [facility]

## 2024-11-12 NOTE — TELEPHONE ENCOUNTER
Action(s) Taken:   Schedule a Adult Preventative     Type of outreach:    Attempted to reach patient but there was no answer/voicemail. Update PCP information if patient returns call.  Send letter in 1 week if not reached/return call.    Sarah Lua CMA ([facility])

## 2025-02-24 ENCOUNTER — TELEPHONE (OUTPATIENT)
Dept: FAMILY MEDICINE | Facility: CLINIC | Age: 60
End: 2025-02-24
Payer: COMMERCIAL

## 2025-02-24 NOTE — LETTER
2/24/2025      Thad HECTOR Charlie  [address]  [address]  [place] [address]    Mr GuerraAna,    We care about your health and have reviewed your health plan including your medical conditions, medications, and lab results.  Based on this review, it is recommended that you follow up regarding the following health topic(s):  -Wellness (Physical) Visit   Health Maintenance Due   Topic Date Due    HEPATITIS B IMMUNIZATION (1 of 3 - 19+ 3-dose series) Never done    DTAP/TDAP/TD IMMUNIZATION (1 - Tdap) Never done    Pneumococcal Vaccine: 50+ Years (1 of 1 - PCV) Never done    ZOSTER IMMUNIZATION (1 of 2) Never done    INFLUENZA VACCINE (1) Never done    COVID-19 Vaccine (1 - 2024-25 season) Never done     We recommend you take the following action(s):  -schedule a WELLNESS (Physical) APPOINTMENT.  We will perform the following labs: None at this time.     Please call us at the [facility] [phone] (or use [employer]) to address the above recommendations.        If you are no longer a patient with us please give us the name of the clinic and/or provider you have transferred your care to so we may update our records and we will no longer reach out to you.    Thank you for trusting [facility] and we appreciate the opportunity to serve you.  We look forward to supporting your healthcare needs in the future.    Healthy Regards,    Your Health Care Team at [facility]

## 2025-02-24 NOTE — TELEPHONE ENCOUNTER
Patient Quality Outreach    Patient is due for the following:   Physical Preventive Adult Physical      Topic Date Due    Hepatitis B Vaccine (1 of 3 - 19+ 3-dose series) Never done    Diptheria Tetanus Pertussis (DTAP/TDAP/TD) Vaccine (1 - Tdap) Never done    Pneumococcal Vaccine (1 of 1 - PCV) Never done    Zoster (Shingles) Vaccine (1 of 2) Never done    Flu Vaccine (1) Never done    COVID-19 Vaccine (1 - 2024-25 season) Never done     Chronic Opioid Use -  Treatment Agreement (CSA), Urine Drug Screen, SIDDHARTH-7, and PHQ-9    Action(s) Taken:   Schedule a Adult Preventative    Type of outreach:    Phone, left message for patient/parent to call back.  Send letter in 1 week if not read/completed/returned call.    Questions for provider review:    Update CSA and UDS at next visit and add F11.2 or F11.9 if appropriate to trigger HEALTH MAINTENANCE items.            Sarah Lua, [facility]  Chart routed to Care Team.